# Patient Record
Sex: FEMALE | Race: WHITE | NOT HISPANIC OR LATINO
[De-identification: names, ages, dates, MRNs, and addresses within clinical notes are randomized per-mention and may not be internally consistent; named-entity substitution may affect disease eponyms.]

---

## 2019-02-04 ENCOUNTER — HOSPITAL (OUTPATIENT)
Dept: OTHER | Age: 29
End: 2019-02-04
Attending: FAMILY MEDICINE

## 2019-02-04 ENCOUNTER — HOSPITAL (OUTPATIENT)
Dept: OTHER | Age: 29
End: 2019-02-04

## 2019-02-04 LAB
ALBUMIN SERPL-MCNC: 4.8 GM/DL (ref 3.6–5.1)
ALBUMIN/GLOB SERPL: 1.1 {RATIO} (ref 1–2.4)
ALP SERPL-CCNC: 99 UNIT/L (ref 45–117)
ALT SERPL-CCNC: 18 UNIT/L
AMORPH SED URNS QL MICRO: ABNORMAL
AMPHETAMINES UR QL SCN>500 NG/ML: NEGATIVE
ANALYZER ANC (IANC): ABNORMAL
ANION GAP SERPL CALC-SCNC: 14 MMOL/L (ref 10–20)
APPEARANCE UR: ABNORMAL
AST SERPL-CCNC: 16 UNIT/L
BACTERIA #/AREA URNS HPF: ABNORMAL /HPF
BARBITURATES UR QL SCN>200 NG/ML: NEGATIVE
BASOPHILS # BLD: 0 THOUSAND/MCL (ref 0–0.3)
BASOPHILS NFR BLD: 0 %
BENZODIAZ UR QL SCN>200 NG/ML: NEGATIVE
BILIRUB SERPL-MCNC: 0.6 MG/DL (ref 0.2–1)
BILIRUB UR QL: NEGATIVE
BUN SERPL-MCNC: 10 MG/DL (ref 6–20)
BUN/CREAT SERPL: 11 (ref 7–25)
BZE UR QL SCN>150 NG/ML: NEGATIVE
CALCIUM SERPL-MCNC: 9.4 MG/DL (ref 8.4–10.2)
CANNABINOIDS UR QL SCN>50 NG/ML: POSITIVE
CAOX CRY URNS QL MICRO: ABNORMAL
CHLORIDE: 107 MMOL/L (ref 98–107)
CO2 SERPL-SCNC: 22 MMOL/L (ref 21–32)
COLOR UR: YELLOW
CREAT SERPL-MCNC: 0.89 MG/DL (ref 0.51–0.95)
DIFFERENTIAL METHOD BLD: ABNORMAL
EOSINOPHIL # BLD: 0.1 THOUSAND/MCL (ref 0.1–0.5)
EOSINOPHIL NFR BLD: 0 %
EPITH CASTS #/AREA URNS LPF: ABNORMAL /[LPF]
ERYTHROCYTE [DISTWIDTH] IN BLOOD: 12.3 % (ref 11–15)
FATTY CASTS #/AREA URNS LPF: ABNORMAL /[LPF]
GLOBULIN SER-MCNC: 4.4 GM/DL (ref 2–4)
GLUCOSE SERPL-MCNC: 127 MG/DL (ref 65–99)
GLUCOSE UR-MCNC: NEGATIVE MG/DL
GRAN CASTS #/AREA URNS LPF: ABNORMAL /[LPF]
HCG POINT OF CARE (5HGRST): NEGATIVE
HCG SERPL QL: NEGATIVE
HEMATOCRIT: 44.9 % (ref 36–46.5)
HGB BLD-MCNC: 14.6 GM/DL (ref 12–15.5)
HGB UR QL: ABNORMAL
HYALINE CASTS #/AREA URNS LPF: ABNORMAL /LPF (ref 0–5)
IMM GRANULOCYTES # BLD AUTO: 0 THOUSAND/MCL (ref 0–0.2)
IMM GRANULOCYTES NFR BLD: 0 %
KETONES UR-MCNC: 80 MG/DL
LEUKOCYTE ESTERASE UR QL STRIP: NEGATIVE
LIPASE SERPL-CCNC: 67 UNIT/L (ref 73–393)
LYMPHOCYTES # BLD: 2.8 THOUSAND/MCL (ref 1–4.8)
LYMPHOCYTES NFR BLD: 20 %
MCH RBC QN AUTO: 30.1 PG (ref 26–34)
MCHC RBC AUTO-ENTMCNC: 32.5 GM/DL (ref 32–36.5)
MCV RBC AUTO: 92.6 FL (ref 78–100)
MIXED CELL CASTS #/AREA URNS LPF: ABNORMAL /[LPF]
MONOCYTES # BLD: 0.9 THOUSAND/MCL (ref 0.3–0.9)
MONOCYTES NFR BLD: 6 %
MUCOUS THREADS URNS QL MICRO: PRESENT
NEUTROPHILS # BLD: 10.4 THOUSAND/MCL (ref 1.8–7.7)
NEUTROPHILS NFR BLD: 74 %
NEUTS SEG NFR BLD: ABNORMAL %
NITRITE UR QL: NEGATIVE
NRBC (NRBCRE): 0 /100 WBC
OPIATES UR QL SCN>300 NG/ML: POSITIVE
PCP UR QL SCN>25 NG/ML: NEGATIVE
PH UR: 5 UNIT (ref 5–7)
PLATELET # BLD: 344 THOUSAND/MCL (ref 140–450)
POTASSIUM SERPL-SCNC: 3.3 MMOL/L (ref 3.4–5.1)
PROT SERPL-MCNC: 9.2 GM/DL (ref 6.4–8.2)
PROT UR QL: 30 MG/DL
RBC # BLD: 4.85 MILLION/MCL (ref 4–5.2)
RBC #/AREA URNS HPF: ABNORMAL /HPF (ref 0–3)
RBC CASTS #/AREA URNS LPF: ABNORMAL /[LPF]
RENAL EPI CELLS #/AREA URNS HPF: ABNORMAL /[HPF]
SODIUM SERPL-SCNC: 140 MMOL/L (ref 135–145)
SP GR UR: 1.02 (ref 1–1.03)
SPECIMEN SOURCE: ABNORMAL
SPERM URNS QL MICRO: ABNORMAL
SQUAMOUS #/AREA URNS HPF: ABNORMAL /HPF (ref 0–5)
T VAGINALIS URNS QL MICRO: ABNORMAL
TRANS CELLS #/AREA URNS HPF: ABNORMAL /HPF
TRI-PHOS CRY URNS QL MICRO: ABNORMAL
URATE CRY URNS QL MICRO: ABNORMAL
URINE REFLEX: ABNORMAL
URNS CMNT MICRO: ABNORMAL
UROBILINOGEN UR QL: 0.2 MG/DL (ref 0–1)
WAXY CASTS #/AREA URNS LPF: ABNORMAL /[LPF]
WBC # BLD: 14.2 THOUSAND/MCL (ref 4.2–11)
WBC #/AREA URNS HPF: ABNORMAL /HPF (ref 0–5)
WBC CASTS #/AREA URNS LPF: ABNORMAL /[LPF]
YEAST HYPHAE URNS QL MICRO: ABNORMAL
YEAST URNS QL MICRO: ABNORMAL

## 2019-02-05 LAB
ANALYZER ANC (IANC): ABNORMAL
ANALYZER ANC (IANC): ABNORMAL
ANION GAP SERPL CALC-SCNC: 10 MMOL/L (ref 10–20)
BASOPHILS # BLD: 0 THOUSAND/MCL (ref 0–0.3)
BASOPHILS NFR BLD: 0 %
BUN SERPL-MCNC: 4 MG/DL (ref 6–20)
BUN/CREAT SERPL: 6 (ref 7–25)
CALCIUM SERPL-MCNC: 8.7 MG/DL (ref 8.4–10.2)
CHLORIDE: 110 MMOL/L (ref 98–107)
CO2 SERPL-SCNC: 22 MMOL/L (ref 21–32)
CREAT SERPL-MCNC: 0.68 MG/DL (ref 0.51–0.95)
DIFFERENTIAL METHOD BLD: ABNORMAL
EOSINOPHIL # BLD: 0 THOUSAND/MCL (ref 0.1–0.5)
EOSINOPHIL NFR BLD: 0 %
ERYTHROCYTE [DISTWIDTH] IN BLOOD: 12.3 % (ref 11–15)
ERYTHROCYTE [DISTWIDTH] IN BLOOD: 12.4 % (ref 11–15)
FOLATE SERPL-MCNC: 10.3 NG/ML
GLUCOSE SERPL-MCNC: 131 MG/DL (ref 65–99)
HEMATOCRIT: 34.4 % (ref 36–46.5)
HEMATOCRIT: 34.7 % (ref 36–46.5)
HGB BLD-MCNC: 11.5 GM/DL (ref 12–15.5)
HGB BLD-MCNC: 11.5 GM/DL (ref 12–15.5)
IMM GRANULOCYTES # BLD AUTO: 0 THOUSAND/MCL (ref 0–0.2)
IMM GRANULOCYTES NFR BLD: 0 %
LYMPHOCYTES # BLD: 2.2 THOUSAND/MCL (ref 1–4.8)
LYMPHOCYTES NFR BLD: 17 %
MCH RBC QN AUTO: 30.3 PG (ref 26–34)
MCH RBC QN AUTO: 30.3 PG (ref 26–34)
MCHC RBC AUTO-ENTMCNC: 33.1 GM/DL (ref 32–36.5)
MCHC RBC AUTO-ENTMCNC: 33.4 GM/DL (ref 32–36.5)
MCV RBC AUTO: 90.5 FL (ref 78–100)
MCV RBC AUTO: 91.3 FL (ref 78–100)
MONOCYTES # BLD: 0.9 THOUSAND/MCL (ref 0.3–0.9)
MONOCYTES NFR BLD: 7 %
NEUTROPHILS # BLD: 9.8 THOUSAND/MCL (ref 1.8–7.7)
NEUTROPHILS NFR BLD: 76 %
NEUTS SEG NFR BLD: ABNORMAL %
NRBC (NRBCRE): 0 /100 WBC
NRBC (NRBCRE): 0 /100 WBC
PLATELET # BLD: 209 THOUSAND/MCL (ref 140–450)
PLATELET # BLD: 232 THOUSAND/MCL (ref 140–450)
POTASSIUM SERPL-SCNC: 3.6 MMOL/L (ref 3.4–5.1)
RBC # BLD: 3.8 MILLION/MCL (ref 4–5.2)
RBC # BLD: 3.8 MILLION/MCL (ref 4–5.2)
SODIUM SERPL-SCNC: 138 MMOL/L (ref 135–145)
TSH SERPL-ACNC: 1.93 MCUNIT/ML (ref 0.35–5)
VIT B12 SERPL-MCNC: 352 PG/ML (ref 211–911)
WBC # BLD: 13 THOUSAND/MCL (ref 4.2–11)
WBC # BLD: 17.7 THOUSAND/MCL (ref 4.2–11)

## 2019-02-06 LAB
ANALYZER ANC (IANC): ABNORMAL
ANION GAP SERPL CALC-SCNC: 9 MMOL/L (ref 10–20)
BUN SERPL-MCNC: 3 MG/DL (ref 6–20)
BUN/CREAT SERPL: 4 (ref 7–25)
CALCIUM SERPL-MCNC: 7.9 MG/DL (ref 8.4–10.2)
CHLORIDE: 108 MMOL/L (ref 98–107)
CO2 SERPL-SCNC: 23 MMOL/L (ref 21–32)
CREAT SERPL-MCNC: 0.76 MG/DL (ref 0.51–0.95)
ERYTHROCYTE [DISTWIDTH] IN BLOOD: 12.2 % (ref 11–15)
GLUCOSE SERPL-MCNC: 94 MG/DL (ref 65–99)
HEMATOCRIT: 34.8 % (ref 36–46.5)
HGB BLD-MCNC: 11.5 GM/DL (ref 12–15.5)
MCH RBC QN AUTO: 30.1 PG (ref 26–34)
MCHC RBC AUTO-ENTMCNC: 33 GM/DL (ref 32–36.5)
MCV RBC AUTO: 91.1 FL (ref 78–100)
NRBC (NRBCRE): 0 /100 WBC
PLATELET # BLD: 209 THOUSAND/MCL (ref 140–450)
POTASSIUM SERPL-SCNC: 3.4 MMOL/L (ref 3.4–5.1)
RBC # BLD: 3.82 MILLION/MCL (ref 4–5.2)
SODIUM SERPL-SCNC: 137 MMOL/L (ref 135–145)
WBC # BLD: 9.1 THOUSAND/MCL (ref 4.2–11)

## 2019-09-16 ENCOUNTER — OFFICE VISIT (OUTPATIENT)
Dept: FAMILY MEDICINE CLINIC | Age: 29
End: 2019-09-16

## 2019-09-16 VITALS
HEIGHT: 64 IN | DIASTOLIC BLOOD PRESSURE: 74 MMHG | RESPIRATION RATE: 17 BRPM | HEART RATE: 107 BPM | TEMPERATURE: 98.2 F | OXYGEN SATURATION: 99 % | BODY MASS INDEX: 26.63 KG/M2 | WEIGHT: 156 LBS | SYSTOLIC BLOOD PRESSURE: 113 MMHG

## 2019-09-16 DIAGNOSIS — G43.109 MIGRAINE WITH AURA AND WITHOUT STATUS MIGRAINOSUS, NOT INTRACTABLE: ICD-10-CM

## 2019-09-16 DIAGNOSIS — G40.419 OTHER GENERALIZED EPILEPSY, INTRACTABLE, WITHOUT STATUS EPILEPTICUS (HCC): ICD-10-CM

## 2019-09-16 DIAGNOSIS — K44.9 HIATAL HERNIA: ICD-10-CM

## 2019-09-16 DIAGNOSIS — E03.9 ACQUIRED HYPOTHYROIDISM: ICD-10-CM

## 2019-09-16 DIAGNOSIS — Z00.00 WELL WOMAN EXAM (NO GYNECOLOGICAL EXAM): Primary | ICD-10-CM

## 2019-09-16 DIAGNOSIS — R11.15 INTRACTABLE CYCLICAL VOMITING WITH NAUSEA: ICD-10-CM

## 2019-09-16 DIAGNOSIS — G43.D1 INTRACTABLE ABDOMINAL MIGRAINE: ICD-10-CM

## 2019-09-16 PROBLEM — K21.9 GERD (GASTROESOPHAGEAL REFLUX DISEASE): Status: ACTIVE | Noted: 2019-09-16

## 2019-09-16 PROBLEM — G40.909 EPILEPSY (HCC): Status: ACTIVE | Noted: 2019-09-16

## 2019-09-16 RX ORDER — LEVOTHYROXINE SODIUM 88 UG/1
TABLET ORAL
COMMUNITY
End: 2019-09-17 | Stop reason: SDUPTHER

## 2019-09-16 RX ORDER — RIZATRIPTAN BENZOATE 10 MG/1
10 TABLET, ORALLY DISINTEGRATING ORAL
COMMUNITY

## 2019-09-16 RX ORDER — RIBOFLAVIN (VITAMIN B2) 400 MG
TABLET ORAL
COMMUNITY

## 2019-09-16 RX ORDER — FLUTICASONE PROPIONATE 50 UG/1
2 SPRAY, METERED NASAL
COMMUNITY

## 2019-09-16 RX ORDER — ONDANSETRON 8 MG/1
8 TABLET, ORALLY DISINTEGRATING ORAL
COMMUNITY

## 2019-09-16 RX ORDER — LEVETIRACETAM 750 MG/1
750 TABLET ORAL 4 TIMES DAILY
COMMUNITY
End: 2019-09-17 | Stop reason: SDUPTHER

## 2019-09-16 RX ORDER — AMITRIPTYLINE HYDROCHLORIDE 100 MG/1
TABLET, FILM COATED ORAL
COMMUNITY
End: 2019-09-17 | Stop reason: SDUPTHER

## 2019-09-16 NOTE — PROGRESS NOTES
[address] [place] [address]               [phone]      Cindy Méndez is a 34 y.o. female and presents with Establish Care; Seizure; and Joint Swelling       Assessment/Plan:    Diagnoses and all orders for this visit:    1. Well woman exam (no gynecological exam)  Comments:  [V70.0]  Here today to establish care and manage acute and chronic conditions  Recently had her gynecological exam done at another facility    2. Hiatal hernia  - States she has a history of hiatal hernia, this was brought on about due to her intractable cyclical vomiting problems    3. Intractable cyclical vomiting with nausea  -     REFERRAL TO NEUROLOGY  - States she was diagnosed with this at the same time she was diagnosed with having her  -We will refer to neurology for further management  4. Other generalized epilepsy, intractable, without status epilepticus ([facility] [address]  -     REFERRAL TO NEUROLOGY  - Has a history of tonic-clonic seizures first starting at the age of 14  - Her last seizure was May 2019  - She is on Keppra for her seizures, and amitriptyline for her migraines and uses Maxalt as an abortive medication    5. Acquired hypothyroidism  - She is currently on Synthroid 88 mics a day  - I suggested drawing her levels however she states she just had them done when she was in [place] trying to get those records to us    6. Intractable abdominal migraine  -     REFERRAL TO NEUROLOGY    7. Migraine with aura and without status migrainosus, not intractable  -     REFERRAL TO NEUROLOGY  Ms. Filipe Lopez is a very pleasant 27-year-old female with a past medical history most significant for seizures and abdominal migraines. Her seizures are not controlled however she is being referred to neurology for further management    Greater than 50% of the additional 45 minutes was spent in counseling and coordination of care.        Follow-up and Dispositions    · Return in about 6 months (around 3/16/2020), or if symptoms worsen or fail to improve, for hypothyroid, 15 minutes. Subjective:    Here today to establish care    ROS:     Review of Systems   Constitutional: Negative. HENT:        Esophogeal tears from chronic vomiting   Eyes: Negative. Respiratory: Negative. Cardiovascular: Negative. Gastrointestinal: Positive for abdominal pain, blood in stool, constipation, heartburn, nausea and vomiting. Heartburn due to hiatal hernia  N/V with cyclic vomiting syndrome  abd pain with abd migraines  occasioal brb in  [place]  Constipation medication induced   Genitourinary: Negative. Musculoskeletal:        Sometimes has generalzed joint pain mostly r/t medical conditions   Skin:        Rosacea    Neurological: Positive for dizziness, seizures, loss of consciousness and headaches. Psychiatric/Behavioral: Positive for memory loss. Negative for depression, hallucinations, substance abuse and suicidal ideas. The patient is nervous/anxious. The patient does not have insomnia. Anxiety r/t seizure disorder  Memory loss determined to be r/t long seizures       The problem list was updated as a part of today's visit. Patient Active Problem List   Diagnosis Code    [facility] ([facility] X90.770    Cyclic vomiting syndrome G43. A0    GERD (gastroesophageal reflux disease) K21.9    Hiatal hernia K44.9    Acquired hypothyroidism E03.9       The PSH, FH were reviewed. SH:  Social History     Tobacco Use    Smoking status: Never Smoker    Smokeless tobacco: Never Used   Substance Use Topics    Alcohol use: Not Currently    Drug use: Not on file       Medications/Allergies:  Current Outpatient Medications on File Prior to Visit   Medication Sig Dispense Refill    ubidecarenone (COQ-10 PO) Take  by mouth.  riboflavin, vitamin B2, 400 mg tab Take  by mouth.  cholecalciferol, vitamin D3, (VITAMIN D3 PO) Take  by mouth.  OTHER 400 mg.  Indications: julio c-mag      levETIRAcetam (KEPPRA) 750 mg tablet Take 750 mg by mouth four (4) times daily.  levothyroxine (SYNTHROID) 88 mcg tablet Take  by mouth Daily (before breakfast).  amitriptyline (ELAVIL) 100 mg tablet Take  by mouth nightly.  rizatriptan (MAXALT-MLT) 10 mg disintegrating tablet Take 10 mg by mouth once as needed for Migraine.  fluticasone propionate (FLONASE ALLERGY RELIEF) 50 mcg/actuation nasal spray 2 Sprays by Both Nostrils route daily as needed for Rhinitis.  ondansetron (ZOFRAN ODT) 8 mg disintegrating tablet Take 8 mg by mouth every eight (8) hours as needed for Nausea.  levonorgestrel (MIRENA) 20 mcg/24 hours (5 yrs) 52 mg IUD 1 Device by IntraUTERine route once. No current facility-administered medications on file prior to visit. Allergies   Allergen Reactions    Milk Containing Products Anaphylaxis    Sulfa (Sulfonamide Antibiotics) Anaphylaxis    Ciprofloxacin Nausea and Vomiting    Codeine Nausea and Vomiting    Doxycycline Hives    Gluten Swelling    Penicillins Hives       Objective:  Visit Vitals  /74 (BP 1 Location: Right arm, BP Patient Position: Sitting)   Pulse (!) 107   Temp 98.2 °F (36.8 °C) (Oral)   Resp 17   Ht 5' 4\" (1.626 m)   Wt 156 lb (70.8 kg)   SpO2 99%   BMI 26.78 kg/m²    Body mass index is 26.78 kg/m². Physical assessment  Physical Exam   Constitutional: She is oriented to person, place, and time and well-developed, well-nourished, and in no distress. Vital signs are normal.   HENT:   Head: Normocephalic and atraumatic. Right Ear: Hearing, tympanic membrane, external ear and ear canal normal.   Left Ear: Hearing, tympanic membrane, external ear and ear canal normal.   Nose: Nose normal.   Mouth/Throat: Uvula is midline, oropharynx is clear and moist and mucous membranes are normal.   Eyes: Pupils are equal, round, and reactive to light. Conjunctivae, EOM and lids are normal.   Neck: Trachea normal and normal range of motion. Neck supple.  No JVD present. Carotid bruit is not present. No tracheal deviation present. No thyroid mass and no thyromegaly present. Cardiovascular: Normal rate, regular rhythm, S1 normal, S2 normal, normal heart sounds and intact distal pulses. Pulmonary/Chest: Effort normal and breath sounds normal.   Abdominal: Soft. Normal appearance and bowel sounds are normal. There is tenderness in the left lower quadrant. There is no CVA tenderness. Musculoskeletal: Normal range of motion. Lymphadenopathy:        Head (right side): No submental, no submandibular, no tonsillar, no preauricular, no posterior auricular and no occipital adenopathy present. Head (left side): No submental, no submandibular, no tonsillar, no preauricular, no posterior auricular and no occipital adenopathy present. She has no cervical adenopathy. Neurological: She is alert and oriented to person, place, and time. She has normal motor skills. Gait normal.   Skin: Skin is warm and dry. Psychiatric: Mood, memory, affect and judgment normal.   Nursing note and vitals reviewed. Labwork and Ancillary Studies:    CBC w/Diff  No results found for: WBC, WBCLT, HGB, HGBP, PLT, HGBEXT, PLTEXT, HGBEXT, PLTEXT      Basic Metabolic Profile  No results found for: NA, K, CL, CO2, AGAP, GLU, BUN, CREA, BUCR, GFRAA, GFRNA, CA, GFRAA     Cholesterol  No results found for: CHOL, CHOLX, CHLST, CHOLV, HDL, HDLP, LDL, LDLC, DLDLP, TGLX, TRIGL, TRIGP, CHHD, [facility]    Health Maintenance:   Health Maintenance   Topic Date Due    DTaP/Tdap/Td series (1 - Tdap) 05/16/2013    PAP AKA CERVICAL CYTOLOGY  05/16/2013    Influenza Age 5 to Adult  08/01/2019    Pneumococcal 0-64 years  Aged Out       I have discussed the diagnosis with the patient and the intended plan as seen in the above orders. The patient has received an After-Visit Summary and questions were answered concerning future plans.      An After Visit Summary was printed and given to the patient. All diagnosis have been discussed with the patient and all of the patient's questions have been answered. Follow-up and Dispositions    · Return in about 6 months (around 3/16/2020), or if symptoms worsen or fail to improve, for hypothyroid, 15 minutes. Gillian Billings, [facility]  [address] [facility]   [address] [facility] [address]  [address]

## 2019-09-16 NOTE — PATIENT INSTRUCTIONS
Hypothyroidism: Care Instructions  Your Care Instructions    You have hypothyroidism, which means that your body is not making enough thyroid hormone. This hormone helps your body use energy. If your thyroid level is low, you may feel tired, be constipated, have an increase in your blood pressure, or have dry skin or memory problems. You may also get cold easily, even when it is warm. Women with low thyroid levels may have heavy menstrual periods. A blood test to find your thyroid-stimulating hormone (TSH) level is used to check for hypothyroidism. A high TSH level may mean that you have low thyroid. When your body is not making enough thyroid hormone, TSH levels rise in an effort to make the body produce more. The treatment for hypothyroidism is to take thyroid hormone pills. You should start to feel better in 1 to 2 weeks. But it can take several months to see changes in the TSH level. You will need regular visits with your doctor to make sure you have the right dose of medicine. Most people need treatment for the rest of their lives. You will need to see your doctor regularly to have blood tests and to make sure you are doing well. Follow-up care is a key part of your treatment and safety. Be sure to make and go to all appointments, and call your doctor if you are having problems. It's also a good idea to know your test results and keep a list of the medicines you take. How can you care for yourself at home? · Take your thyroid hormone medicine exactly as prescribed. Call your doctor if you think you are having a problem with your medicine. Most people do not have side effects if they take the right amount of medicine regularly. ? Take the medicine 30 minutes before breakfast, and do not take it with calcium, vitamins, or iron. ? Do not take extra doses of your thyroid medicine. It will not help you get better any faster, and it may cause side effects.   ? If you forget to take a dose, do NOT take a double dose of medicine. Take your usual dose the next day. · Tell your doctor about all prescription, herbal, or over-the-counter products you take. · Take care of yourself. Eat a healthy diet, get enough sleep, and get regular exercise. When should you call for help? Call 911 anytime you think you may need emergency care. For example, call if:    · You passed out (lost consciousness).     · You have severe trouble breathing.     · You have a very slow heartbeat (less than 60 beats a minute).     · You have a low body temperature (95°F or below).    Call your doctor now or seek immediate medical care if:    · You feel tired, sluggish, or weak.     · You have trouble remembering things or concentrating.     · You do not begin to feel better 2 weeks after starting your medicine.    Watch closely for changes in your health, and be sure to contact your doctor if you have any problems. Where can you learn more? Go to http://blake-safia.info/. Enter J741 in the search box to learn more about \"Hypothyroidism: Care Instructions. \"  Current as of: November 6, 2018  Content Version: 12.1  © 6344-8427 [employer]. Care instructions adapted under license by [employer] (which disclaims liability or warranty for this information). If you have questions about a medical condition or this instruction, always ask your healthcare professional. [address] any warranty or liability for your use of this information.

## 2019-09-17 RX ORDER — LEVETIRACETAM 750 MG/1
750 TABLET ORAL 4 TIMES DAILY
Qty: 360 TAB | Refills: 1 | Status: SHIPPED | OUTPATIENT
Start: 2019-09-17 | End: 2020-07-29

## 2019-09-17 RX ORDER — LEVOTHYROXINE SODIUM 88 UG/1
88 TABLET ORAL
Qty: 90 TAB | Refills: 1 | Status: SHIPPED | OUTPATIENT
Start: 2019-09-17 | End: 2020-07-29

## 2019-09-17 RX ORDER — AMITRIPTYLINE HYDROCHLORIDE 100 MG/1
100 TABLET, FILM COATED ORAL
Qty: 90 TAB | Refills: 1 | Status: SHIPPED | OUTPATIENT
Start: 2019-09-17 | End: 2020-06-05

## 2019-10-08 RX ORDER — LEVETIRACETAM 750 MG/1
750 TABLET, EXTENDED RELEASE ORAL 4 TIMES DAILY
Qty: 128 TAB | Refills: 0 | Status: SHIPPED | OUTPATIENT
Start: 2019-10-08 | End: 2020-07-29

## 2020-07-06 RX ORDER — AMITRIPTYLINE HYDROCHLORIDE 100 MG/1
TABLET, FILM COATED ORAL
Qty: 30 TAB | Refills: 11 | OUTPATIENT
Start: 2020-07-06

## 2020-07-20 ENCOUNTER — APPOINTMENT (OUTPATIENT)
Dept: CT IMAGING | Age: 30
DRG: 103 | End: 2020-07-20
Attending: PHYSICIAN ASSISTANT
Payer: OTHER GOVERNMENT

## 2020-07-20 ENCOUNTER — HOSPITAL ENCOUNTER (EMERGENCY)
Age: 30
Discharge: HOME | DRG: 103 | End: 2020-07-20
Attending: EMERGENCY MEDICINE
Payer: OTHER GOVERNMENT

## 2020-07-20 VITALS
DIASTOLIC BLOOD PRESSURE: 70 MMHG | WEIGHT: 170 LBS | OXYGEN SATURATION: 100 % | SYSTOLIC BLOOD PRESSURE: 111 MMHG | HEART RATE: 87 BPM | BODY MASS INDEX: 29.18 KG/M2 | RESPIRATION RATE: 16 BRPM | TEMPERATURE: 98 F

## 2020-07-20 DIAGNOSIS — R11.15 CYCLICAL VOMITING: Primary | ICD-10-CM

## 2020-07-20 DIAGNOSIS — K52.9 COLITIS: ICD-10-CM

## 2020-07-20 LAB
ALBUMIN SERPL-MCNC: 4.2 G/DL (ref 3.4–5)
ALBUMIN/GLOB SERPL: 0.9 {RATIO} (ref 0.8–1.7)
ALP SERPL-CCNC: 112 U/L (ref 45–117)
ALT SERPL-CCNC: 43 U/L (ref 13–56)
ANION GAP SERPL CALC-SCNC: 6 MMOL/L (ref 3–18)
AST SERPL-CCNC: 23 U/L (ref 10–38)
BASOPHILS # BLD: 0 K/UL (ref 0–0.1)
BASOPHILS NFR BLD: 0 % (ref 0–2)
BILIRUB SERPL-MCNC: 0.6 MG/DL (ref 0.2–1)
BUN SERPL-MCNC: 13 MG/DL (ref 7–18)
BUN/CREAT SERPL: 14 (ref 12–20)
CALCIUM SERPL-MCNC: 9.1 MG/DL (ref 8.5–10.1)
CHLORIDE SERPL-SCNC: 108 MMOL/L (ref 100–111)
CO2 SERPL-SCNC: 25 MMOL/L (ref 21–32)
CREAT SERPL-MCNC: 0.96 MG/DL (ref 0.6–1.3)
DIFFERENTIAL METHOD BLD: ABNORMAL
EOSINOPHIL # BLD: 0 K/UL (ref 0–0.4)
EOSINOPHIL NFR BLD: 0 % (ref 0–5)
ERYTHROCYTE [DISTWIDTH] IN BLOOD BY AUTOMATED COUNT: 13.4 % (ref 11.6–14.5)
GLOBULIN SER CALC-MCNC: 4.8 G/DL (ref 2–4)
GLUCOSE SERPL-MCNC: 121 MG/DL (ref 74–99)
HCG SERPL QL: NEGATIVE
HCT VFR BLD AUTO: 40.6 % (ref 35–45)
HGB BLD-MCNC: 13.1 G/DL (ref 12–16)
LIPASE SERPL-CCNC: 66 U/L (ref 73–393)
LYMPHOCYTES # BLD: 2.4 K/UL (ref 0.9–3.6)
LYMPHOCYTES NFR BLD: 12 % (ref 21–52)
MCH RBC QN AUTO: 29.4 PG (ref 24–34)
MCHC RBC AUTO-ENTMCNC: 32.3 G/DL (ref 31–37)
MCV RBC AUTO: 91.2 FL (ref 74–97)
MONOCYTES # BLD: 1 K/UL (ref 0.05–1.2)
MONOCYTES NFR BLD: 5 % (ref 3–10)
NEUTS SEG # BLD: 16.2 K/UL (ref 1.8–8)
NEUTS SEG NFR BLD: 83 % (ref 40–73)
PLATELET # BLD AUTO: 473 K/UL (ref 135–420)
PMV BLD AUTO: 11.1 FL (ref 9.2–11.8)
POTASSIUM SERPL-SCNC: 3.5 MMOL/L (ref 3.5–5.5)
PROT SERPL-MCNC: 9 G/DL (ref 6.4–8.2)
RBC # BLD AUTO: 4.45 M/UL (ref 4.2–5.3)
SODIUM SERPL-SCNC: 139 MMOL/L (ref 136–145)
WBC # BLD AUTO: 19.7 K/UL (ref 4.6–13.2)

## 2020-07-20 PROCEDURE — 96361 HYDRATE IV INFUSION ADD-ON: CPT

## 2020-07-20 PROCEDURE — 93005 ELECTROCARDIOGRAM TRACING: CPT

## 2020-07-20 PROCEDURE — 74011250636 HC RX REV CODE- 250/636: Performed by: EMERGENCY MEDICINE

## 2020-07-20 PROCEDURE — 84703 CHORIONIC GONADOTROPIN ASSAY: CPT

## 2020-07-20 PROCEDURE — 74011250636 HC RX REV CODE- 250/636: Performed by: PHYSICIAN ASSISTANT

## 2020-07-20 PROCEDURE — 80053 COMPREHEN METABOLIC PANEL: CPT

## 2020-07-20 PROCEDURE — 74011636320 HC RX REV CODE- 636/320: Performed by: EMERGENCY MEDICINE

## 2020-07-20 PROCEDURE — 96374 THER/PROPH/DIAG INJ IV PUSH: CPT

## 2020-07-20 PROCEDURE — 85025 COMPLETE CBC W/AUTO DIFF WBC: CPT

## 2020-07-20 PROCEDURE — 96376 TX/PRO/DX INJ SAME DRUG ADON: CPT

## 2020-07-20 PROCEDURE — 74177 CT ABD & PELVIS W/CONTRAST: CPT

## 2020-07-20 PROCEDURE — 83690 ASSAY OF LIPASE: CPT

## 2020-07-20 PROCEDURE — 99285 EMERGENCY DEPT VISIT HI MDM: CPT

## 2020-07-20 PROCEDURE — 96375 TX/PRO/DX INJ NEW DRUG ADDON: CPT

## 2020-07-20 RX ORDER — HALOPERIDOL 5 MG/ML
2 INJECTION INTRAMUSCULAR
Status: COMPLETED | OUTPATIENT
Start: 2020-07-20 | End: 2020-07-20

## 2020-07-20 RX ORDER — ONDANSETRON 2 MG/ML
4 INJECTION INTRAMUSCULAR; INTRAVENOUS
Status: COMPLETED | OUTPATIENT
Start: 2020-07-20 | End: 2020-07-20

## 2020-07-20 RX ORDER — ONDANSETRON 2 MG/ML
INJECTION INTRAMUSCULAR; INTRAVENOUS
Status: DISCONTINUED
Start: 2020-07-20 | End: 2020-07-21 | Stop reason: HOSPADM

## 2020-07-20 RX ORDER — CIPROFLOXACIN 500 MG/1
500 TABLET ORAL 2 TIMES DAILY
Qty: 14 TAB | Refills: 0 | Status: SHIPPED | OUTPATIENT
Start: 2020-07-20 | End: 2020-07-25

## 2020-07-20 RX ORDER — DIPHENHYDRAMINE HYDROCHLORIDE 50 MG/ML
25 INJECTION, SOLUTION INTRAMUSCULAR; INTRAVENOUS
Status: COMPLETED | OUTPATIENT
Start: 2020-07-20 | End: 2020-07-20

## 2020-07-20 RX ORDER — KETOROLAC TROMETHAMINE 30 MG/ML
30 INJECTION, SOLUTION INTRAMUSCULAR; INTRAVENOUS
Status: COMPLETED | OUTPATIENT
Start: 2020-07-20 | End: 2020-07-20

## 2020-07-20 RX ORDER — LORAZEPAM 2 MG/ML
0.5 INJECTION INTRAMUSCULAR
Status: COMPLETED | OUTPATIENT
Start: 2020-07-20 | End: 2020-07-20

## 2020-07-20 RX ORDER — NALOXONE HYDROCHLORIDE 0.4 MG/ML
0.4 INJECTION, SOLUTION INTRAMUSCULAR; INTRAVENOUS; SUBCUTANEOUS AS NEEDED
Status: DISCONTINUED | OUTPATIENT
Start: 2020-07-20 | End: 2020-07-21 | Stop reason: HOSPADM

## 2020-07-20 RX ORDER — HALOPERIDOL 5 MG/ML
3 INJECTION INTRAMUSCULAR
Status: COMPLETED | OUTPATIENT
Start: 2020-07-20 | End: 2020-07-20

## 2020-07-20 RX ORDER — POLYETHYLENE GLYCOL 3350 17 G/17G
17 POWDER, FOR SOLUTION ORAL DAILY
Qty: 116 G | Refills: 0 | Status: SHIPPED | OUTPATIENT
Start: 2020-07-20

## 2020-07-20 RX ORDER — SENNOSIDES 8.6 MG/1
1 TABLET ORAL DAILY
Qty: 20 TAB | Refills: 0 | Status: SHIPPED | OUTPATIENT
Start: 2020-07-20

## 2020-07-20 RX ORDER — MORPHINE SULFATE 4 MG/ML
4 INJECTION, SOLUTION INTRAMUSCULAR; INTRAVENOUS
Status: COMPLETED | OUTPATIENT
Start: 2020-07-20 | End: 2020-07-20

## 2020-07-20 RX ORDER — METRONIDAZOLE 500 MG/1
500 TABLET ORAL 3 TIMES DAILY
Qty: 21 TAB | Refills: 0 | Status: SHIPPED | OUTPATIENT
Start: 2020-07-20 | End: 2020-07-25

## 2020-07-20 RX ADMIN — DIPHENHYDRAMINE HYDROCHLORIDE 25 MG: 50 INJECTION, SOLUTION INTRAMUSCULAR; INTRAVENOUS at 19:57

## 2020-07-20 RX ADMIN — SODIUM CHLORIDE 1000 ML: 900 INJECTION, SOLUTION INTRAVENOUS at 17:41

## 2020-07-20 RX ADMIN — KETOROLAC TROMETHAMINE 30 MG: 30 INJECTION, SOLUTION INTRAMUSCULAR at 19:55

## 2020-07-20 RX ADMIN — ONDANSETRON 4 MG: 2 INJECTION INTRAMUSCULAR; INTRAVENOUS at 18:36

## 2020-07-20 RX ADMIN — IOPAMIDOL 93 ML: 612 INJECTION, SOLUTION INTRAVENOUS at 20:07

## 2020-07-20 RX ADMIN — HALOPERIDOL LACTATE 2 MG: 5 INJECTION, SOLUTION INTRAMUSCULAR at 17:03

## 2020-07-20 RX ADMIN — MORPHINE SULFATE 4 MG: 4 INJECTION, SOLUTION INTRAMUSCULAR; INTRAVENOUS at 17:24

## 2020-07-20 RX ADMIN — SODIUM CHLORIDE 1000 ML: 900 INJECTION, SOLUTION INTRAVENOUS at 21:01

## 2020-07-20 RX ADMIN — LORAZEPAM 0.5 MG: 2 INJECTION, SOLUTION INTRAMUSCULAR; INTRAVENOUS at 19:54

## 2020-07-20 RX ADMIN — ONDANSETRON 4 MG: 2 INJECTION INTRAMUSCULAR; INTRAVENOUS at 17:03

## 2020-07-20 RX ADMIN — HALOPERIDOL LACTATE 3 MG: 5 INJECTION, SOLUTION INTRAMUSCULAR at 19:56

## 2020-07-20 NOTE — ED NOTES
Pt states inability to void for urine specimen, instructed to keep arm straight to aid in IV fluid admin. Pt states understanding.

## 2020-07-20 NOTE — ED PROVIDER NOTES
EMERGENCY DEPARTMENT HISTORY AND PHYSICAL EXAM      Date: 7/20/2020  Patient Name: Jaja Trevizo    History of Presenting Illness     Chief Complaint   Patient presents with    Vomiting       History Provided By: Patient    HPI: Jaja Trevizo, 31 y.o. female PMHx significant for epilepsy, cyclic vomiting, GERD, hiatal hernia presents via ambulance to the ED with cc of cyclical vomiting. Patient reports she believes it is because she is constipated. Patient reports she smokes medical marijuana to treat her cyclical vomiting. Patient is unsure of last BM. Patient reports taking Elavil for cyclical vomiting symptoms. Pt is currently on menstrual cycle. Denies previous abd surgeries. There are no other complaints, changes, or physical findings at this time. PCP: Yris Tran NP    No current facility-administered medications on file prior to encounter. Current Outpatient Medications on File Prior to Encounter   Medication Sig Dispense Refill    amitriptyline (ELAVIL) 100 mg tablet TAKE 1 TABLET NIGHTLY 30 Tab 0    levETIRAcetam (KEPPRA XR) 750 mg ER tablet Take 1 Tab by mouth four (4) times daily. 128 Tab 0    levETIRAcetam (KEPPRA) 750 mg tablet Take 1 Tab by mouth four (4) times daily. 360 Tab 1    levothyroxine (SYNTHROID) 88 mcg tablet Take 1 Tab by mouth Daily (before breakfast). 90 Tab 1    ubidecarenone (COQ-10 PO) Take  by mouth.  riboflavin, vitamin B2, 400 mg tab Take  by mouth.  cholecalciferol, vitamin D3, (VITAMIN D3 PO) Take  by mouth.  OTHER 400 mg. Indications: julio c-mag      rizatriptan (MAXALT-MLT) 10 mg disintegrating tablet Take 10 mg by mouth once as needed for Migraine.  fluticasone propionate (FLONASE ALLERGY RELIEF) 50 mcg/actuation nasal spray 2 Sprays by Both Nostrils route daily as needed for Rhinitis.  ondansetron (ZOFRAN ODT) 8 mg disintegrating tablet Take 8 mg by mouth every eight (8) hours as needed for Nausea.       levonorgestrel (MIRENA) 20 mcg/24 hours (5 yrs) 52 mg IUD 1 Device by IntraUTERine route once. Past History     Past Medical History:  Past Medical History:   Diagnosis Date    Cyclic vomiting syndrome     Epilepsy ([address]     GERD (gastroesophageal reflux disease)     Hiatal hernia        Past Surgical History:  History reviewed. No pertinent surgical history. Family History:  Family History   Problem Relation Age of Onset    Osteoporosis Mother     Osteoporosis Maternal Grandmother        Social History:  Social History     Tobacco Use    Smoking status: Never Smoker    Smokeless tobacco: Never Used   Substance Use Topics    Alcohol use: Not Currently    Drug use: Not on file       Allergies: Allergies   Allergen Reactions    Milk Containing Products Anaphylaxis    Sulfa (Sulfonamide Antibiotics) Anaphylaxis    Ciprofloxacin Nausea and Vomiting    Codeine Nausea and Vomiting    Doxycycline Hives    Gluten Swelling    Penicillins Hives         Review of Systems   Review of Systems   Constitutional: Negative for chills and fever. Respiratory: Negative for shortness of breath. Cardiovascular: Negative for chest pain. Gastrointestinal: Positive for vomiting. Negative for abdominal pain and nausea. Genitourinary: Negative for flank pain. Musculoskeletal: Negative for back pain and myalgias. Skin: Negative for color change, pallor, rash and wound. Neurological: Negative for dizziness, weakness and light-headedness. All other systems reviewed and are negative. Physical Exam   Physical Exam  Vitals signs and nursing note reviewed. Constitutional:       General: She is not in acute distress. Appearance: She is well-developed. Comments: Patient actively vomiting   HENT:      Head: Normocephalic and atraumatic. Eyes:      Conjunctiva/sclera: Conjunctivae normal.   Cardiovascular:      Rate and Rhythm: Normal rate and regular rhythm. Heart sounds: Normal heart sounds. Pulmonary:      Effort: Pulmonary effort is normal. No respiratory distress. Breath sounds: Normal breath sounds. Abdominal:      General: Bowel sounds are normal. There is no distension. Palpations: Abdomen is soft. Comments: General abdominal tenderness  No guarding or rigidity  Abdomen soft, nondistended   Musculoskeletal: Normal range of motion. Skin:     General: Skin is warm. Findings: No rash. Neurological:      Mental Status: She is alert and oriented to person, place, and time. Psychiatric:         Behavior: Behavior normal.         Diagnostic Study Results     Labs -     Recent Results (from the past 12 hour(s))   CBC WITH AUTOMATED DIFF    Collection Time: 07/20/20  5:07 PM   Result Value Ref Range    WBC 19.7 (H) 4.6 - 13.2 K/uL    RBC 4.45 4.20 - 5.30 M/uL    HGB 13.1 12.0 - 16.0 g/dL    HCT 40.6 35.0 - 45.0 %    MCV 91.2 74.0 - 97.0 FL    MCH 29.4 24.0 - 34.0 PG    MCHC 32.3 31.0 - 37.0 g/dL    RDW 13.4 11.6 - 14.5 %    PLATELET 951 (H) 789 - 420 K/uL    MPV 11.1 9.2 - 11.8 FL    NEUTROPHILS 83 (H) 40 - 73 %    LYMPHOCYTES 12 (L) 21 - 52 %    MONOCYTES 5 3 - 10 %    EOSINOPHILS 0 0 - 5 %    BASOPHILS 0 0 - 2 %    ABS. NEUTROPHILS 16.2 (H) 1.8 - 8.0 K/UL    ABS. LYMPHOCYTES 2.4 0.9 - 3.6 K/UL    ABS. MONOCYTES 1.0 0.05 - 1.2 K/UL    ABS. EOSINOPHILS 0.0 0.0 - 0.4 K/UL    ABS.  BASOPHILS 0.0 0.0 - 0.1 K/UL    DF AUTOMATED     METABOLIC PANEL, COMPREHENSIVE    Collection Time: 07/20/20  5:07 PM   Result Value Ref Range    Sodium 139 136 - 145 mmol/L    Potassium 3.5 3.5 - 5.5 mmol/L    Chloride 108 100 - 111 mmol/L    CO2 25 21 - 32 mmol/L    Anion gap 6 3.0 - 18 mmol/L    Glucose 121 (H) 74 - 99 mg/dL    BUN 13 7.0 - 18 MG/DL    Creatinine 0.96 0.6 - 1.3 MG/DL    BUN/Creatinine ratio 14 12 - 20      GFR est AA >60 >60 ml/min/1.73m2    GFR est non-AA >60 >60 ml/min/1.73m2    Calcium 9.1 8.5 - 10.1 MG/DL    Bilirubin, total 0.6 0.2 - 1.0 MG/DL    ALT (SGPT) 43 13 - 56 U/L AST (SGOT) 23 10 - 38 U/L    Alk. phosphatase 112 45 - 117 U/L    Protein, total 9.0 (H) 6.4 - 8.2 g/dL    Albumin 4.2 3.4 - 5.0 g/dL    Globulin 4.8 (H) 2.0 - 4.0 g/dL    A-G Ratio 0.9 0.8 - 1.7     LIPASE    Collection Time: 07/20/20  5:07 PM   Result Value Ref Range    Lipase 66 (L) 73 - 393 U/L   HCG QL SERUM    Collection Time: 07/20/20  5:07 PM   Result Value Ref Range    HCG, Ql. Negative NEG     EKG, 12 LEAD, INITIAL    Collection Time: 07/20/20  5:24 PM   Result Value Ref Range    Ventricular Rate 65 BPM    Atrial Rate 65 BPM    P-R Interval 108 ms    QRS Duration 84 ms    Q-T Interval 396 ms    QTC Calculation (Bezet) 411 ms    Calculated P Axis 39 degrees    Calculated R Axis 83 degrees    Calculated T Axis 58 degrees    Diagnosis       Sinus rhythm with short W[place]  Otherwise normal ECG  No previous ECGs available         Radiologic Studies -   CT ABD PELV W CONT    (Results Pending)     CT Results  (Last 48 hours)    None        CXR Results  (Last 48 hours)    None          Medical Decision Making   I am the first provider for this patient. I reviewed the vital signs, available nursing notes, past medical history, past surgical history, family history and social history. Vital Signs-Reviewed the patient's vital signs. Patient Vitals for the past 12 hrs:   Temp Pulse Resp BP SpO2   07/20/20 1900  69  107/66    07/20/20 1830  69  107/55    07/20/20 1820  75  121/71    07/20/20 1800  86 18 110/69    07/20/20 1750  70 20 113/85    07/20/20 1740  90 19 114/69    07/20/20 1730  66 26 120/81    07/20/20 1643 97.9 °F (36.6 °C) 83 15 (!) 150/104 100 %         Records Reviewed: Nursing Notes and Old Medical Records    Provider Notes (Medical Decision Making):   DDx: Cyclical vomiting, dehydration, electrolyte abnormality, SBO, UTI, Constipation, colitis    32 yo F who presents for cyclical vomiting that began earlier today. She has cyclical vomiting. Pt also reports constipation. Leukocytosis. CT scan shows possible colitis. Will treat with Cipro and Flagyl. Discussed with patient marijuana is likely the cause of the cyclical vomiting. Upon discharge her vomiting is well controlled. Patient nontoxic-appearing, afebrile, not tachycardic. Patient stable for prompt outpatient follow-up with PCP in 1-2 days. Patient given strict instructions to return if symptoms do not improve or worsen. ED Course:   Initial assessment performed. The patients presenting problems have been discussed, and they are in agreement with the care plan formulated and outlined with them. I have encouraged them to ask questions as they arise throughout their visit. Vomiting has subsided upon discharge. Disposition:  9:27 PM  Discussed lab and imaging results with pt along with dx and treatment plan. Discussed importance of PCP follow up. All questions answered. Pt voiced they understood. Return if sx worsen. PLAN:  1. Current Discharge Medication List      START taking these medications    Details   ciprofloxacin HCl (Cipro) 500 mg tablet Take 1 Tab by mouth two (2) times a day for 7 days. Qty: 14 Tab, Refills: 0      metroNIDAZOLE (FlagyL) 500 mg tablet Take 1 Tab by mouth three (3) times daily for 7 days. Qty: 21 Tab, Refills: 0      senna (Senna) 8.6 mg tablet Take 1 Tab by mouth daily. Qty: 20 Tab, Refills: 0      polyethylene glycol (Miralax) 17 gram/dose powder Take 17 g by mouth daily. 1 tablespoon with 8 oz of water daily  Qty: 116 g, Refills: 0           2.    Follow-up Information     Follow up With Specialties Details Why Contact Info    Pelon Sahu NP Nurse Practitioner Schedule an appointment as soon as possible for a visit in 1 day  [address] [address] [facility] [address] [phone]      [facility] EMERGENCY DEPT Emergency Medicine  If symptoms worsen [address]  [phone]        Return to ED if worse     Diagnosis     Clinical Impression:   1. Cyclical vomiting    2. Colitis        Attestations:    AMBERLY Marx    Please note that this dictation was completed with [employer], the computer voice recognition software. Quite often unanticipated grammatical, syntax, homophones, and other interpretive errors are inadvertently transcribed by the computer software. Please disregard these errors. Please excuse any errors that have escaped final proofreading. Thank you.

## 2020-07-20 NOTE — ED TRIAGE NOTES
Pt presents for cyclic vomiting. States she takes Elavil and is having an episode bc she hasnt had a BM.  Cannot remember last BM

## 2020-07-21 LAB
ATRIAL RATE: 65 BPM
CALCULATED P AXIS, ECG09: 39 DEGREES
CALCULATED R AXIS, ECG10: 83 DEGREES
CALCULATED T AXIS, ECG11: 58 DEGREES
DIAGNOSIS, 93000: NORMAL
P-R INTERVAL, ECG05: 108 MS
Q-T INTERVAL, ECG07: 396 MS
QRS DURATION, ECG06: 84 MS
QTC CALCULATION (BEZET), ECG08: 411 MS
VENTRICULAR RATE, ECG03: 65 BPM

## 2020-07-21 PROCEDURE — 99284 EMERGENCY DEPT VISIT MOD MDM: CPT

## 2020-07-21 PROCEDURE — 96375 TX/PRO/DX INJ NEW DRUG ADDON: CPT

## 2020-07-21 PROCEDURE — 96365 THER/PROPH/DIAG IV INF INIT: CPT

## 2020-07-21 NOTE — DISCHARGE INSTRUCTIONS
Patient Education     Colitis: Care Instructions  Your Care Instructions  Colitis is the medical term for swelling (inflammation) of the intestine. It can be caused by different things, such as an infection or loss of blood flow in the intestine. Other causes are problems like Crohn's disease or ulcerative colitis. Symptoms may include fever, diarrhea that may be bloody, or belly pain. Sometimes symptoms go away without treatment. But you may need treatment or more tests, such as blood tests or a stool test. Or you may need imaging tests like a CT scan or a colonoscopy. In some cases, the doctor may want to test a sample of tissue from the intestine. This test is called a biopsy. The doctor has checked you carefully, but problems can develop later. If you notice any problems or new symptoms, get medical treatment right away. Follow-up care is a key part of your treatment and safety. Be sure to make and go to all appointments, and call your doctor if you are having problems. It's also a good idea to know your test results and keep a list of the medicines you take. How can you care for yourself at home? · Rest until you feel better. · Your doctor may recommend that you eat bland foods. These include rice, dry toast or crackers, bananas, and applesauce. · To prevent dehydration, drink plenty of fluids. Choose water and other caffeine-free clear liquids until you feel better. If you have kidney, heart, or liver disease and have to limit fluids, talk with your doctor before you increase the amount of fluids you drink. · Be safe with medicines. Take your medicines exactly as prescribed. Call your doctor if you think you are having a problem with your medicine. You will get more details on the specific medicines your doctor prescribes. When should you call for help? Call 911 anytime you think you may need emergency care. For example, call if:  · You passed out (lost consciousness).   · You vomit blood or what looks like coffee grounds. · Your stools are maroon or very bloody. Call your doctor now or seek immediate medical care if:  · You have new or worse pain. · You have a new or higher fever. · You have new or worse symptoms. · You cannot keep fluids or medicines down. Watch closely for changes in your health, and be sure to contact your doctor if:  · You do not get better as expected. Where can you learn more? Go to MOBITRAC.be  Enter [id number] in the search box to learn more about \"Colitis: Care Instructions. \"   © 6696-9708 Healthwise, [employer]. Care instructions adapted under license by [employer] (which disclaims liability or warranty for this information). This care instruction is for use with your licensed healthcare professional. If you have questions about a medical condition or this instruction, always ask your healthcare professional. [address] any warranty or liability for your use of this information. Content Version: 54.9.633822; Current as of: November 20, 2015         Patient Education        Learning About Cyclic Vomiting Syndrome  What is it? An adult or child who has cyclic vomiting syndrome (CVS) has repeated bouts of severe vomiting and nausea. Between the vomiting episodes, the person's health is normal. The cause of CVS isn't known, but it may be related to migraine headaches. What happens when you have CVS?  CVS causes severe nausea, vomiting, and drooling. You may not be able to walk or talk during an episode. You may look pale. You may have a fever and feel very tired and thirsty. Some people with CVS have belly pain and diarrhea. Bouts of vomiting can last for a few hours or a few days. People with this condition tend to have a typical pattern of attacks. For example, one person may have bouts of CVS 4 times a year and always in the morning. Another person may have 8 bouts a year and always in the evening.   Some people with CVS have triggers that set off the vomiting. People have reported an infection, such as a cold, as a trigger. Other triggers include stress, menstrual cycles, and certain foods like chocolate or cheese. Children tend to have more triggers than adults do. How is CVS treated? Treatment is centered around easing the nausea and vomiting. The doctor may prescribe medicine. During very bad bouts of vomiting, your doctor may want you to stay in the hospital for a while. You may get fluids through a vein (IV) to prevent dehydration. Dehydration can be serious. Your body needs fluids to make enough blood. Without a good supply of blood, vital organs such as the heart and brain can't work as well as they should. When should you call for help? Call your doctor now or seek immediate medical care if:  · You have new or worse belly pain. · You have a fever. · You are vomiting. · You cannot pass stools or gas. · You have symptoms of dehydration, such as:  ? Dry eyes and a dry mouth. ? Passing only a little urine. ? Feeling thirstier than usual.  Watch closely for changes in your health, and be sure to contact your doctor if you have any problems. Follow-up care is a key part of your treatment and safety. Be sure to make and go to all appointments, and call your doctor if you are having problems. It's also a good idea to know your test results and keep a list of the medicines you take. Where can you learn more? Go to http://blake-safia.info/  Enter G167 in the search box to learn more about \"Learning About Cyclic Vomiting Syndrome. \"  Current as of: August 12, 2019               Content Version: 12.5  © 2468-4764 Healthwise, Incorporated. Care instructions adapted under license by [employer] (which disclaims liability or warranty for this information).  If you have questions about a medical condition or this instruction, always ask your healthcare professional. [employer] disclaims any warranty or liability for your use of this information.

## 2020-07-21 NOTE — ED NOTES
Patient awake and alert. Patient complains of abdominal pain-  made aware. Patient assisted to comfortable position and warm blanket- Patient placed on cardiac monitor , BP and pulse ox continuous.

## 2020-07-22 ENCOUNTER — APPOINTMENT (OUTPATIENT)
Dept: GENERAL RADIOLOGY | Age: 30
DRG: 103 | End: 2020-07-22
Attending: INTERNAL MEDICINE
Payer: OTHER GOVERNMENT

## 2020-07-22 ENCOUNTER — HOSPITAL ENCOUNTER (INPATIENT)
Age: 30
LOS: 3 days | Discharge: HOME | DRG: 103 | End: 2020-07-25
Attending: EMERGENCY MEDICINE | Admitting: INTERNAL MEDICINE
Payer: OTHER GOVERNMENT

## 2020-07-22 ENCOUNTER — APPOINTMENT (OUTPATIENT)
Dept: CT IMAGING | Age: 30
DRG: 103 | End: 2020-07-22
Attending: EMERGENCY MEDICINE
Payer: OTHER GOVERNMENT

## 2020-07-22 DIAGNOSIS — K52.9 ACUTE COLITIS: ICD-10-CM

## 2020-07-22 DIAGNOSIS — R11.15 CYCLIC VOMITING SYNDROME: ICD-10-CM

## 2020-07-22 DIAGNOSIS — R10.84 ACUTE GENERALIZED ABDOMINAL PAIN: Primary | ICD-10-CM

## 2020-07-22 DIAGNOSIS — R11.2 NON-INTRACTABLE VOMITING WITH NAUSEA, UNSPECIFIED VOMITING TYPE: ICD-10-CM

## 2020-07-22 PROBLEM — N39.0 UTI (URINARY TRACT INFECTION): Status: ACTIVE | Noted: 2020-07-22

## 2020-07-22 LAB
ALBUMIN SERPL-MCNC: 4.3 G/DL (ref 3.4–5)
ALBUMIN/GLOB SERPL: 1.1 {RATIO} (ref 0.8–1.7)
ALP SERPL-CCNC: 101 U/L (ref 45–117)
ALT SERPL-CCNC: 45 U/L (ref 13–56)
ANION GAP SERPL CALC-SCNC: 6 MMOL/L (ref 3–18)
APPEARANCE UR: CLEAR
AST SERPL-CCNC: 33 U/L (ref 10–38)
BACTERIA URNS QL MICRO: ABNORMAL /HPF
BASOPHILS # BLD: 0.1 K/UL (ref 0–0.1)
BASOPHILS NFR BLD: 0 % (ref 0–2)
BILIRUB SERPL-MCNC: 0.4 MG/DL (ref 0.2–1)
BILIRUB UR QL: NEGATIVE
BUN SERPL-MCNC: 11 MG/DL (ref 7–18)
BUN/CREAT SERPL: 13 (ref 12–20)
CALCIUM SERPL-MCNC: 8.6 MG/DL (ref 8.5–10.1)
CHLORIDE SERPL-SCNC: 109 MMOL/L (ref 100–111)
CO2 SERPL-SCNC: 25 MMOL/L (ref 21–32)
COLOR UR: ABNORMAL
CREAT SERPL-MCNC: 0.83 MG/DL (ref 0.6–1.3)
DIFFERENTIAL METHOD BLD: ABNORMAL
EOSINOPHIL # BLD: 0.1 K/UL (ref 0–0.4)
EOSINOPHIL NFR BLD: 0 % (ref 0–5)
EPITH CASTS URNS QL MICRO: ABNORMAL /LPF (ref 0–5)
ERYTHROCYTE [DISTWIDTH] IN BLOOD BY AUTOMATED COUNT: 13.7 % (ref 11.6–14.5)
GLOBULIN SER CALC-MCNC: 3.9 G/DL (ref 2–4)
GLUCOSE SERPL-MCNC: 102 MG/DL (ref 74–99)
GLUCOSE UR STRIP.AUTO-MCNC: NEGATIVE MG/DL
HCG SERPL QL: NEGATIVE
HCT VFR BLD AUTO: 38.7 % (ref 35–45)
HGB BLD-MCNC: 12.4 G/DL (ref 12–16)
HGB UR QL STRIP: ABNORMAL
KETONES UR QL STRIP.AUTO: 40 MG/DL
LACTATE SERPL-SCNC: 1.6 MMOL/L (ref 0.4–2)
LEUKOCYTE ESTERASE UR QL STRIP.AUTO: ABNORMAL
LIPASE SERPL-CCNC: 87 U/L (ref 73–393)
LYMPHOCYTES # BLD: 2.5 K/UL (ref 0.9–3.6)
LYMPHOCYTES NFR BLD: 13 % (ref 21–52)
MCH RBC QN AUTO: 29.7 PG (ref 24–34)
MCHC RBC AUTO-ENTMCNC: 32 G/DL (ref 31–37)
MCV RBC AUTO: 92.8 FL (ref 74–97)
MONOCYTES # BLD: 1.2 K/UL (ref 0.05–1.2)
MONOCYTES NFR BLD: 6 % (ref 3–10)
NEUTS SEG # BLD: 16.4 K/UL (ref 1.8–8)
NEUTS SEG NFR BLD: 81 % (ref 40–73)
NITRITE UR QL STRIP.AUTO: NEGATIVE
PH UR STRIP: 5 [PH] (ref 5–8)
PLATELET # BLD AUTO: 409 K/UL (ref 135–420)
PMV BLD AUTO: 10.9 FL (ref 9.2–11.8)
POTASSIUM SERPL-SCNC: 3.5 MMOL/L (ref 3.5–5.5)
PROT SERPL-MCNC: 8.2 G/DL (ref 6.4–8.2)
PROT UR STRIP-MCNC: NEGATIVE MG/DL
RBC # BLD AUTO: 4.17 M/UL (ref 4.2–5.3)
RBC #/AREA URNS HPF: ABNORMAL /HPF (ref 0–5)
SODIUM SERPL-SCNC: 140 MMOL/L (ref 136–145)
SP GR UR REFRACTOMETRY: >1.03 (ref 1–1.03)
UROBILINOGEN UR QL STRIP.AUTO: 0.2 EU/DL (ref 0.2–1)
WBC # BLD AUTO: 20.2 K/UL (ref 4.6–13.2)
WBC URNS QL MICRO: ABNORMAL /HPF (ref 0–4)

## 2020-07-22 PROCEDURE — 84703 CHORIONIC GONADOTROPIN ASSAY: CPT

## 2020-07-22 PROCEDURE — 83605 ASSAY OF LACTIC ACID: CPT

## 2020-07-22 PROCEDURE — 74011636320 HC RX REV CODE- 636/320: Performed by: EMERGENCY MEDICINE

## 2020-07-22 PROCEDURE — 80053 COMPREHEN METABOLIC PANEL: CPT

## 2020-07-22 PROCEDURE — 65270000029 HC RM PRIVATE

## 2020-07-22 PROCEDURE — 74011250636 HC RX REV CODE- 250/636: Performed by: EMERGENCY MEDICINE

## 2020-07-22 PROCEDURE — 74011250636 HC RX REV CODE- 250/636: Performed by: INTERNAL MEDICINE

## 2020-07-22 PROCEDURE — 85025 COMPLETE CBC W/AUTO DIFF WBC: CPT

## 2020-07-22 PROCEDURE — 74011250637 HC RX REV CODE- 250/637: Performed by: HOSPITALIST

## 2020-07-22 PROCEDURE — 83690 ASSAY OF LIPASE: CPT

## 2020-07-22 PROCEDURE — 81001 URINALYSIS AUTO W/SCOPE: CPT

## 2020-07-22 PROCEDURE — 87086 URINE CULTURE/COLONY COUNT: CPT

## 2020-07-22 PROCEDURE — 87040 BLOOD CULTURE FOR BACTERIA: CPT

## 2020-07-22 PROCEDURE — 74011250637 HC RX REV CODE- 250/637: Performed by: INTERNAL MEDICINE

## 2020-07-22 PROCEDURE — 74177 CT ABD & PELVIS W/CONTRAST: CPT

## 2020-07-22 PROCEDURE — 71045 X-RAY EXAM CHEST 1 VIEW: CPT

## 2020-07-22 PROCEDURE — C9113 INJ PANTOPRAZOLE SODIUM, VIA: HCPCS | Performed by: EMERGENCY MEDICINE

## 2020-07-22 RX ORDER — LEVETIRACETAM 750 MG/1
3000 TABLET, EXTENDED RELEASE ORAL
Status: DISCONTINUED | OUTPATIENT
Start: 2020-07-22 | End: 2020-07-22 | Stop reason: CLARIF

## 2020-07-22 RX ORDER — AMITRIPTYLINE HYDROCHLORIDE 50 MG/1
100 TABLET, FILM COATED ORAL
Status: DISCONTINUED | OUTPATIENT
Start: 2020-07-22 | End: 2020-07-25 | Stop reason: HOSPADM

## 2020-07-22 RX ORDER — PANTOPRAZOLE SODIUM 40 MG/10ML
40 INJECTION, POWDER, LYOPHILIZED, FOR SOLUTION INTRAVENOUS
Status: COMPLETED | OUTPATIENT
Start: 2020-07-22 | End: 2020-07-22

## 2020-07-22 RX ORDER — LEVOTHYROXINE SODIUM 100 UG/1
100 TABLET ORAL
Status: DISCONTINUED | OUTPATIENT
Start: 2020-07-23 | End: 2020-07-25 | Stop reason: HOSPADM

## 2020-07-22 RX ORDER — PANTOPRAZOLE SODIUM 40 MG/10ML
40 INJECTION, POWDER, LYOPHILIZED, FOR SOLUTION INTRAVENOUS DAILY PRN
Status: DISCONTINUED | OUTPATIENT
Start: 2020-07-22 | End: 2020-07-24

## 2020-07-22 RX ORDER — MORPHINE SULFATE 4 MG/ML
4 INJECTION, SOLUTION INTRAMUSCULAR; INTRAVENOUS
Status: COMPLETED | OUTPATIENT
Start: 2020-07-22 | End: 2020-07-22

## 2020-07-22 RX ORDER — DIPHENHYDRAMINE HYDROCHLORIDE 50 MG/ML
12.5 INJECTION, SOLUTION INTRAMUSCULAR; INTRAVENOUS
Status: COMPLETED | OUTPATIENT
Start: 2020-07-22 | End: 2020-07-22

## 2020-07-22 RX ORDER — LEVETIRACETAM 500 MG/1
1500 TABLET ORAL 2 TIMES DAILY
Status: DISCONTINUED | OUTPATIENT
Start: 2020-07-22 | End: 2020-07-25 | Stop reason: HOSPADM

## 2020-07-22 RX ORDER — KETOROLAC TROMETHAMINE 30 MG/ML
30 INJECTION, SOLUTION INTRAMUSCULAR; INTRAVENOUS
Status: COMPLETED | OUTPATIENT
Start: 2020-07-22 | End: 2020-07-22

## 2020-07-22 RX ORDER — HYDROMORPHONE HYDROCHLORIDE 1 MG/ML
1 INJECTION, SOLUTION INTRAMUSCULAR; INTRAVENOUS; SUBCUTANEOUS
Status: COMPLETED | OUTPATIENT
Start: 2020-07-22 | End: 2020-07-22

## 2020-07-22 RX ORDER — ONDANSETRON 2 MG/ML
4 INJECTION INTRAMUSCULAR; INTRAVENOUS
Status: DISCONTINUED | OUTPATIENT
Start: 2020-07-22 | End: 2020-07-25 | Stop reason: HOSPADM

## 2020-07-22 RX ORDER — PROCHLORPERAZINE EDISYLATE 5 MG/ML
10 INJECTION INTRAMUSCULAR; INTRAVENOUS
Status: DISCONTINUED | OUTPATIENT
Start: 2020-07-22 | End: 2020-07-25 | Stop reason: HOSPADM

## 2020-07-22 RX ORDER — METOCLOPRAMIDE HYDROCHLORIDE 5 MG/ML
5 INJECTION INTRAMUSCULAR; INTRAVENOUS
Status: COMPLETED | OUTPATIENT
Start: 2020-07-22 | End: 2020-07-22

## 2020-07-22 RX ORDER — LEVOTHYROXINE SODIUM 88 UG/1
88 TABLET ORAL
Status: DISCONTINUED | OUTPATIENT
Start: 2020-07-25 | End: 2020-07-22

## 2020-07-22 RX ORDER — ONDANSETRON 2 MG/ML
4 INJECTION INTRAMUSCULAR; INTRAVENOUS
Status: COMPLETED | OUTPATIENT
Start: 2020-07-22 | End: 2020-07-22

## 2020-07-22 RX ORDER — HALOPERIDOL 5 MG/ML
5 INJECTION INTRAMUSCULAR
Status: COMPLETED | OUTPATIENT
Start: 2020-07-22 | End: 2020-07-22

## 2020-07-22 RX ORDER — MORPHINE SULFATE 2 MG/ML
2-4 INJECTION, SOLUTION INTRAMUSCULAR; INTRAVENOUS
Status: DISCONTINUED | OUTPATIENT
Start: 2020-07-22 | End: 2020-07-22

## 2020-07-22 RX ORDER — IPRATROPIUM BROMIDE AND ALBUTEROL SULFATE 2.5; .5 MG/3ML; MG/3ML
3 SOLUTION RESPIRATORY (INHALATION)
Status: DISCONTINUED | OUTPATIENT
Start: 2020-07-22 | End: 2020-07-25 | Stop reason: HOSPADM

## 2020-07-22 RX ORDER — ACETAMINOPHEN 325 MG/1
650 TABLET ORAL
Status: DISCONTINUED | OUTPATIENT
Start: 2020-07-22 | End: 2020-07-25 | Stop reason: HOSPADM

## 2020-07-22 RX ORDER — OXYCODONE AND ACETAMINOPHEN 5; 325 MG/1; MG/1
1 TABLET ORAL
Status: DISCONTINUED | OUTPATIENT
Start: 2020-07-22 | End: 2020-07-24

## 2020-07-22 RX ORDER — MORPHINE SULFATE 2 MG/ML
2 INJECTION, SOLUTION INTRAMUSCULAR; INTRAVENOUS
Status: DISCONTINUED | OUTPATIENT
Start: 2020-07-22 | End: 2020-07-24

## 2020-07-22 RX ORDER — SODIUM CHLORIDE 9 MG/ML
125 INJECTION, SOLUTION INTRAVENOUS CONTINUOUS
Status: DISPENSED | OUTPATIENT
Start: 2020-07-22 | End: 2020-07-25

## 2020-07-22 RX ORDER — LANOLIN ALCOHOL/MO/W.PET/CERES
12 CREAM (GRAM) TOPICAL
Status: DISCONTINUED | OUTPATIENT
Start: 2020-07-22 | End: 2020-07-25 | Stop reason: HOSPADM

## 2020-07-22 RX ORDER — LEVOTHYROXINE SODIUM 100 UG/1
100 TABLET ORAL
COMMUNITY

## 2020-07-22 RX ORDER — LORAZEPAM 2 MG/ML
0.5 INJECTION INTRAMUSCULAR
Status: DISCONTINUED | OUTPATIENT
Start: 2020-07-22 | End: 2020-07-22

## 2020-07-22 RX ORDER — METRONIDAZOLE 500 MG/100ML
500 INJECTION, SOLUTION INTRAVENOUS EVERY 8 HOURS
Status: DISCONTINUED | OUTPATIENT
Start: 2020-07-22 | End: 2020-07-23

## 2020-07-22 RX ORDER — LEVOFLOXACIN 5 MG/ML
750 INJECTION, SOLUTION INTRAVENOUS EVERY 24 HOURS
Status: DISCONTINUED | OUTPATIENT
Start: 2020-07-22 | End: 2020-07-25 | Stop reason: HOSPADM

## 2020-07-22 RX ORDER — LEVOTHYROXINE SODIUM 88 UG/1
88 TABLET ORAL
Status: DISCONTINUED | OUTPATIENT
Start: 2020-07-25 | End: 2020-07-25 | Stop reason: HOSPADM

## 2020-07-22 RX ORDER — DIPHENHYDRAMINE HYDROCHLORIDE 50 MG/ML
25 INJECTION, SOLUTION INTRAMUSCULAR; INTRAVENOUS
Status: COMPLETED | OUTPATIENT
Start: 2020-07-22 | End: 2020-07-22

## 2020-07-22 RX ORDER — DOCUSATE SODIUM 100 MG/1
100 CAPSULE, LIQUID FILLED ORAL
Status: DISCONTINUED | OUTPATIENT
Start: 2020-07-22 | End: 2020-07-25 | Stop reason: HOSPADM

## 2020-07-22 RX ADMIN — LEVETIRACETAM 1500 MG: 500 TABLET ORAL at 17:57

## 2020-07-22 RX ADMIN — ONDANSETRON 4 MG: 2 SOLUTION INTRAMUSCULAR; INTRAVENOUS at 01:12

## 2020-07-22 RX ADMIN — IOPAMIDOL 100 ML: 612 INJECTION, SOLUTION INTRAVENOUS at 01:33

## 2020-07-22 RX ADMIN — MELATONIN 12 MG: at 22:28

## 2020-07-22 RX ADMIN — MORPHINE SULFATE 2 MG: 2 INJECTION, SOLUTION INTRAMUSCULAR; INTRAVENOUS at 12:36

## 2020-07-22 RX ADMIN — SODIUM CHLORIDE 1000 ML: 900 INJECTION, SOLUTION INTRAVENOUS at 00:53

## 2020-07-22 RX ADMIN — METRONIDAZOLE 500 MG: 500 INJECTION, SOLUTION INTRAVENOUS at 17:07

## 2020-07-22 RX ADMIN — HYDROMORPHONE HYDROCHLORIDE 1 MG: 1 INJECTION, SOLUTION INTRAMUSCULAR; INTRAVENOUS; SUBCUTANEOUS at 02:32

## 2020-07-22 RX ADMIN — METRONIDAZOLE 500 MG: 500 INJECTION, SOLUTION INTRAVENOUS at 08:19

## 2020-07-22 RX ADMIN — SODIUM CHLORIDE 125 ML/HR: 900 INJECTION, SOLUTION INTRAVENOUS at 12:12

## 2020-07-22 RX ADMIN — MORPHINE SULFATE 2 MG: 2 INJECTION, SOLUTION INTRAMUSCULAR; INTRAVENOUS at 22:28

## 2020-07-22 RX ADMIN — METOCLOPRAMIDE 5 MG: 5 INJECTION, SOLUTION INTRAMUSCULAR; INTRAVENOUS at 00:45

## 2020-07-22 RX ADMIN — SODIUM CHLORIDE 1000 ML: 900 INJECTION, SOLUTION INTRAVENOUS at 00:54

## 2020-07-22 RX ADMIN — ONDANSETRON 4 MG: 2 INJECTION INTRAMUSCULAR; INTRAVENOUS at 08:16

## 2020-07-22 RX ADMIN — ONDANSETRON 4 MG: 2 INJECTION INTRAMUSCULAR; INTRAVENOUS at 12:20

## 2020-07-22 RX ADMIN — KETOROLAC TROMETHAMINE 30 MG: 30 INJECTION, SOLUTION INTRAMUSCULAR at 00:45

## 2020-07-22 RX ADMIN — HALOPERIDOL LACTATE 5 MG: 5 INJECTION, SOLUTION INTRAMUSCULAR at 04:10

## 2020-07-22 RX ADMIN — MORPHINE SULFATE 4 MG: 4 INJECTION, SOLUTION INTRAMUSCULAR; INTRAVENOUS at 00:45

## 2020-07-22 RX ADMIN — DIPHENHYDRAMINE HYDROCHLORIDE 25 MG: 50 INJECTION, SOLUTION INTRAMUSCULAR; INTRAVENOUS at 04:10

## 2020-07-22 RX ADMIN — SODIUM CHLORIDE 125 ML/HR: 900 INJECTION, SOLUTION INTRAVENOUS at 21:11

## 2020-07-22 RX ADMIN — AMITRIPTYLINE HYDROCHLORIDE 100 MG: 50 TABLET, FILM COATED ORAL at 22:27

## 2020-07-22 RX ADMIN — METOCLOPRAMIDE 5 MG: 5 INJECTION, SOLUTION INTRAMUSCULAR; INTRAVENOUS at 02:33

## 2020-07-22 RX ADMIN — ONDANSETRON 4 MG: 2 INJECTION INTRAMUSCULAR; INTRAVENOUS at 19:36

## 2020-07-22 RX ADMIN — DIPHENHYDRAMINE HYDROCHLORIDE 12.5 MG: 50 INJECTION, SOLUTION INTRAMUSCULAR; INTRAVENOUS at 00:45

## 2020-07-22 RX ADMIN — SODIUM CHLORIDE 1000 ML: 900 INJECTION, SOLUTION INTRAVENOUS at 02:34

## 2020-07-22 RX ADMIN — MORPHINE SULFATE 2 MG: 2 INJECTION, SOLUTION INTRAMUSCULAR; INTRAVENOUS at 08:16

## 2020-07-22 RX ADMIN — OXYCODONE HYDROCHLORIDE AND ACETAMINOPHEN 1 TABLET: 5; 325 TABLET ORAL at 20:37

## 2020-07-22 RX ADMIN — LEVOFLOXACIN 750 MG: 5 INJECTION, SOLUTION INTRAVENOUS at 02:33

## 2020-07-22 RX ADMIN — MORPHINE SULFATE 2 MG: 2 INJECTION, SOLUTION INTRAMUSCULAR; INTRAVENOUS at 17:07

## 2020-07-22 RX ADMIN — METRONIDAZOLE 500 MG: 500 INJECTION, SOLUTION INTRAVENOUS at 02:33

## 2020-07-22 RX ADMIN — PANTOPRAZOLE SODIUM 40 MG: 40 INJECTION, POWDER, FOR SOLUTION INTRAVENOUS at 02:33

## 2020-07-22 NOTE — ED TRIAGE NOTES
Pt to ED via EMS with complaints of generalized abdominal pain, nausea, and vomiting that started two days ago. Pt reports being diagnosed with colitis in the ED yesterday.

## 2020-07-22 NOTE — CONSULTS
General Surgery Consult    Alexandru Schneider  Admit date: 2020    MRN: [id number]     : 1990     Age: 31 y.o. Attending Physician: Radha Lux MD [facility]      History of Present Illness:      Alexandru Schneider is a 31 y.o. female who I was consulted for evaluation for possible colitis. The patient is currently in the emergency room but she is already being admitted to the medicine service for evaluation and treatment. The patient was here yesterday with her abdominal pain as well with nausea and vomiting and she was sent home on p.o. antibiotics but because she did not get better she came back to the emergency room. Interestingly when I spoke with the patient in detail about her history she said that she has been having chronic abdominal pain with nausea and vomiting for more than 10 years now. She said that she moved to [place] last year and she stated that she had a GI doctor and she has been diagnosed with what she called \"cyclic vomiting syndrome\" which she prescribed as significant abdominal pain with nausea and vomiting that happen frequently. She stated that it is a very rare syndrome according to her and very few knows about it. She said that she is usually developed abdominal pain that caused her to be nauseated and having vomiting sometimes. She said that her current episode has been there for about 2 weeks and she was supposed to see her PCP but when she called 2 days ago they told her that they are backed up and they asked her to go to an urgent care. However because she could not drive herself she was brought to the emergency room here at [facility]. This when she was seen yesterday and then she was seen again today for worsening of her abdominal pain and nausea and vomiting. The patient vital signs are normal with no fever but she had mild tachycardia in the 100 and now it is 93. She has significant leukocytosis yesterday it was 19.7 and today is the same 20.   Her creatinine and liver function test and lipase and lactate are normal.  On her CT scan from yesterday there was no clear evidence of colitis but there was some suspicion of colitis and today on the CT scan there was no evidence at all of any colitis. Patient Active Problem List    Diagnosis Date Noted    UTI (urinary tract infection) 07/22/2020    Nausea & vomiting 07/22/2020    Colitis, acute 07/22/2020    Epilepsy ([facility] [address] 35/60/4124    Cyclic vomiting syndrome 09/16/2019    GERD (gastroesophageal reflux disease) 09/16/2019    Hiatal hernia 09/16/2019    Acquired hypothyroidism 09/16/2019     Past Medical History:   Diagnosis Date    Cyclic vomiting syndrome     Epilepsy ([facility] [address]     GERD (gastroesophageal reflux disease)     Hiatal hernia       History reviewed. No pertinent surgical history.    Social History     Tobacco Use    Smoking status: Never Smoker    Smokeless tobacco: Never Used   Substance Use Topics    Alcohol use: Not Currently      Social History     Tobacco Use   Smoking Status Never Smoker   Smokeless Tobacco Never Used     Family History   Problem Relation Age of Onset    Osteoporosis Mother     Osteoporosis Maternal Grandmother       Current Facility-Administered Medications   Medication Dose Route Frequency    metroNIDAZOLE (FLAGYL) IVPB premix 500 mg  500 mg IntraVENous Q8H    levoFLOXacin (LEVAQUIN) 750 mg in D5W IVPB  750 mg IntraVENous Q24H    amitriptyline (ELAVIL) tablet 100 mg  100 mg Oral QHS    0.9% sodium chloride infusion  125 mL/hr IntraVENous CONTINUOUS    ondansetron (ZOFRAN) injection 4 mg  4 mg IntraVENous Q4H PRN    docusate sodium (COLACE) capsule 100 mg  100 mg Oral BID PRN    acetaminophen (TYLENOL) tablet 650 mg  650 mg Oral Q6H PRN    pantoprazole (PROTONIX) injection 40 mg  40 mg IntraVENous DAILY PRN    albuterol-ipratropium (DUO-NEB) 2.5 MG-0.5 MG/3 ML  3 mL Nebulization Q6H PRN    melatonin tablet 12 mg  12 mg Oral QHS PRN    oxyCODONE-acetaminophen (PERCOCET) 5-325 mg per tablet 1 Tab  1 Tab Oral Q4H PRN    morphine injection 2 mg  2 mg IntraVENous Q3H PRN    prochlorperazine (COMPAZINE) injection 10 mg  10 mg IntraVENous Q6H PRN    levETIRAcetam (KEPPRA) tablet 1,500 mg  1,500 mg Oral BID    [START ON 7/25/2020] levothyroxine (SYNTHROID) tablet 88 mcg  88 mcg Oral Once per day on Sun Sat    [START ON 7/23/2020] levothyroxine (SYNTHROID) tablet 100 mcg  100 mcg Oral Once per day on Mon Tue Wed Thu Fri     Current Outpatient Medications   Medication Sig    levothyroxine (SYNTHROID) 100 mcg tablet Take 100 mcg by mouth five (5) days a week. Monday - Friday    amitriptyline (ELAVIL) 100 mg tablet TAKE 1 TABLET NIGHTLY (Patient taking differently: 100 mg nightly.)    levETIRAcetam (KEPPRA XR) 750 mg ER tablet Take 1 Tab by mouth four (4) times daily. (Patient taking differently: Take 3,000 mg by mouth nightly.)    levothyroxine (SYNTHROID) 88 mcg tablet Take 1 Tab by mouth Daily (before breakfast). (Patient taking differently: Take 88 mcg by mouth two (2) days a week. Saturday and Sunday)    rizatriptan (MAXALT-MLT) 10 mg disintegrating tablet Take 10 mg by mouth once as needed for Migraine. Take 1 tablet by mouth at onset of headache and repeat in 2 hours as needed. Max 2 tablets in 25 hours or 10 tablets in 1 week.  ciprofloxacin HCl (Cipro) 500 mg tablet Take 1 Tab by mouth two (2) times a day for 7 days.  metroNIDAZOLE (FlagyL) 500 mg tablet Take 1 Tab by mouth three (3) times daily for 7 days.  senna (Senna) 8.6 mg tablet Take 1 Tab by mouth daily.  polyethylene glycol (Miralax) 17 gram/dose powder Take 17 g by mouth daily. 1 tablespoon with 8 oz of water daily    levETIRAcetam (KEPPRA) 750 mg tablet Take 1 Tab by mouth four (4) times daily.  ubidecarenone (COQ-10 PO) Take  by mouth.  riboflavin, vitamin B2, 400 mg tab Take  by mouth.  cholecalciferol, vitamin D3, (VITAMIN D3 PO) Take  by mouth.  OTHER 400 mg.  Indications: julio c-mag    fluticasone propionate (Flonase Allergy Relief) 50 mcg/actuation nasal spray 2 Sprays by Both Nostrils route daily as needed for Rhinitis.  ondansetron (ZOFRAN ODT) 8 mg disintegrating tablet Take 8 mg by mouth every eight (8) hours as needed for Nausea.  levonorgestrel (MIRENA) 20 mcg/24 hours (5 yrs) 52 mg IUD 1 Device by IntraUTERine route once. Allergies   Allergen Reactions    Milk Containing Products Anaphylaxis    Sulfa (Sulfonamide Antibiotics) Anaphylaxis    Ciprofloxacin Nausea and Vomiting    Codeine Nausea and Vomiting    Doxycycline Hives    Gluten Swelling    Penicillins Hives        Review of Systems:  Constitutional: negative  Eyes: negative  Ears, Nose, Mouth, Throat, and Face: negative  Respiratory: negative  Cardiovascular: negative  Gastrointestinal: positive for nausea, vomiting and abdominal pain  Genitourinary:negative  Integument/Breast: negative  Hematologic/Lymphatic: negative  Musculoskeletal:negative  Neurological: negative  Behavioral/Psychiatric: negative  Endocrine: negative  Allergic/Immunologic: negative    Objective:     Visit Vitals  /63 (BP 1 Location: Left arm, BP Patient Position: At rest)   Pulse 93   Temp 97.9 °F (36.6 °C)   Resp 16   Ht 5' 2\" (1.575 m)   Wt 77.1 kg (170 lb)   LMP 07/20/2020   SpO2 100%   BMI 31.09 kg/m²       Physical Exam:      General:  in no apparent distress, alert, oriented times 3, afebrile, normal vitals and cooperative   Eyes:  conjunctivae and sclerae normal, pupils equal, round, reactive to light   Throat & Neck: no erythema or exudates noted and neck supple and symmetrical; no palpable masses   Lungs:   clear to auscultation bilaterally   Heart:  Regular rate and rhythm   Abdomen:   rounded, soft, nontender, nondistended, no masses or organomegaly. No evidence of abdominal wall hernias.    Extremities: extremities normal, atraumatic, no cyanosis or edema   Skin: Normal.       Imaging and Lab Review:     CBC: Lab Results   Component Value Date/Time    WBC 20.2 (H) 07/22/2020 12:20 AM    RBC 4.17 (L) 07/22/2020 12:20 AM    HGB 12.4 07/22/2020 12:20 AM    HCT 38.7 07/22/2020 12:20 AM    PLATELET 417 76/96/6940 12:20 AM     BMP:   Lab Results   Component Value Date/Time    Glucose 102 (H) 07/22/2020 12:20 AM    Sodium 140 07/22/2020 12:20 AM    Potassium 3.5 07/22/2020 12:20 AM    Chloride 109 07/22/2020 12:20 AM    CO2 25 07/22/2020 12:20 AM    BUN 11 07/22/2020 12:20 AM    Creatinine 0.83 07/22/2020 12:20 AM    Calcium 8.6 07/22/2020 12:20 AM     CMP:  Lab Results   Component Value Date/Time    Glucose 102 (H) 07/22/2020 12:20 AM    Sodium 140 07/22/2020 12:20 AM    Potassium 3.5 07/22/2020 12:20 AM    Chloride 109 07/22/2020 12:20 AM    CO2 25 07/22/2020 12:20 AM    BUN 11 07/22/2020 12:20 AM    Creatinine 0.83 07/22/2020 12:20 AM    Calcium 8.6 07/22/2020 12:20 AM    Anion gap 6 07/22/2020 12:20 AM    BUN/Creatinine ratio 13 07/22/2020 12:20 AM    Alk. phosphatase 101 07/22/2020 12:20 AM    Protein, total 8.2 07/22/2020 12:20 AM    Albumin 4.3 07/22/2020 12:20 AM    Globulin 3.9 07/22/2020 12:20 AM    A-G Ratio 1.1 07/22/2020 12:20 AM       Recent Results (from the past 24 hour(s))   CBC WITH AUTOMATED DIFF    Collection Time: 07/22/20 12:20 AM   Result Value Ref Range    WBC 20.2 (H) 4.6 - 13.2 K/uL    RBC 4.17 (L) 4.20 - 5.30 M/uL    HGB 12.4 12.0 - 16.0 g/dL    HCT 38.7 35.0 - 45.0 %    MCV 92.8 74.0 - 97.0 FL    MCH 29.7 24.0 - 34.0 PG    MCHC 32.0 31.0 - 37.0 g/dL    RDW 13.7 11.6 - 14.5 %    PLATELET 577 370 - 788 K/uL    MPV 10.9 9.2 - 11.8 FL    NEUTROPHILS 81 (H) 40 - 73 %    LYMPHOCYTES 13 (L) 21 - 52 %    MONOCYTES 6 3 - 10 %    EOSINOPHILS 0 0 - 5 %    BASOPHILS 0 0 - 2 %    ABS. NEUTROPHILS 16.4 (H) 1.8 - 8.0 K/UL    ABS. LYMPHOCYTES 2.5 0.9 - 3.6 K/UL    ABS. MONOCYTES 1.2 0.05 - 1.2 K/UL    ABS. EOSINOPHILS 0.1 0.0 - 0.4 K/UL    ABS.  BASOPHILS 0.1 0.0 - 0.1 K/UL    DF AUTOMATED     METABOLIC PANEL, COMPREHENSIVE    Collection Time: 07/22/20 12:20 AM   Result Value Ref Range    Sodium 140 136 - 145 mmol/L    Potassium 3.5 3.5 - 5.5 mmol/L    Chloride 109 100 - 111 mmol/L    CO2 25 21 - 32 mmol/L    Anion gap 6 3.0 - 18 mmol/L    Glucose 102 (H) 74 - 99 mg/dL    BUN 11 7.0 - 18 MG/DL    Creatinine 0.83 0.6 - 1.3 MG/DL    BUN/Creatinine ratio 13 12 - 20      GFR est AA >60 >60 ml/min/1.73m2    GFR est non-AA >60 >60 ml/min/1.73m2    Calcium 8.6 8.5 - 10.1 MG/DL    Bilirubin, total 0.4 0.2 - 1.0 MG/DL    ALT (SGPT) 45 13 - 56 U/L    AST (SGOT) 33 10 - 38 U/L    Alk.  phosphatase 101 45 - 117 U/L    Protein, total 8.2 6.4 - 8.2 g/dL    Albumin 4.3 3.4 - 5.0 g/dL    Globulin 3.9 2.0 - 4.0 g/dL    A-G Ratio 1.1 0.8 - 1.7     LIPASE    Collection Time: 07/22/20 12:20 AM   Result Value Ref Range    Lipase 87 73 - 393 U/L   HCG QL SERUM    Collection Time: 07/22/20 12:20 AM   Result Value Ref Range    HCG, Ql. Negative NEG     CULTURE, BLOOD    Collection Time: 07/22/20  2:00 AM    Specimen: Blood   Result Value Ref Range    Special Requests: PERIPHERAL      Culture result: NO GROWTH AFTER 6 HOURS     CULTURE, BLOOD    Collection Time: 07/22/20  2:00 AM    Specimen: Blood   Result Value Ref Range    Special Requests: PERIPHERAL      Culture result: NO GROWTH AFTER 6 HOURS     LACTIC ACID    Collection Time: 07/22/20  2:15 AM   Result Value Ref Range    Lactic acid 1.6 0.4 - 2.0 MMOL/L   URINALYSIS W/ RFLX MICROSCOPIC    Collection Time: 07/22/20  2:20 AM   Result Value Ref Range    Color DARK YELLOW      Appearance CLEAR      Specific gravity >1.030 (H) 1.005 - 1.030    pH (UA) 5.0 5.0 - 8.0      Protein Negative NEG mg/dL    Glucose Negative NEG mg/dL    Ketone 40 (A) NEG mg/dL    Bilirubin Negative NEG      Blood MODERATE (A) NEG      Urobilinogen 0.2 0.2 - 1.0 EU/dL    Nitrites Negative NEG      Leukocyte Esterase TRACE (A) NEG     URINE MICROSCOPIC ONLY    Collection Time: 07/22/20  2:20 AM   Result Value Ref Range WBC 11 to 20 0 - 4 /hpf    RBC 0 to 3 0 - 5 /hpf    Epithelial cells 1+ 0 - 5 /lpf    Bacteria 1+ (A) NEG /hpf       images and reports reviewed    Assessment:   Maulik Ngo is a 31 y.o. female is presenting with long history of abdominal pain nausea and vomiting and she stated that she is diagnosed with cyclic vomiting syndrome. This episode has been lasting for about 2 weeks according to her and she is been having constipation as well. Her CT scan did not show evidence of colitis however she has leukocytosis. From the general surgery standpoint there is no need for any surgical intervention currently. I do recommend a GI consultation especially that the patient does not have a gastroenterologist and she need to establish one giving her long history of nausea abdominal pain and constipation. Plan:     I appreciate the medicine admission and management as well as the consultation  I do not see any acute surgical issue currently however her leukocytosis is concerning and need to be addressed.    CT scan did not show evidence of colitis but I think she should be on antibiotic given her leukocytosis  Consider GI consultation for sure for evaluation and management of her cyclic vomiting syndrome which I am not familiar with  N.p.o. for now  IV fluids  Repeat labs tomorrow  We will follow    Please call me if you have any questions (cell phone: [phone])     Signed By: Mg Painter MD     July 22, 2020

## 2020-07-22 NOTE — PROGRESS NOTES
Pharmacy: Non-Formulary Medication [facility]    Please note that medication levetiracetam XR 3000 mg qhs is not on the formulary and has been changed to levetiracetam 1500 mg BID. If patient prefers to take home medication, please have family member bring from home for pharmacy to verify. Please call pharmacy for questions.     Thanks,  Sydnie Rose, PharmD

## 2020-07-22 NOTE — PROGRESS NOTES
Problem: Discharge Planning  Goal: *Discharge to safe environment  Outcome: Progressing Towards Goal  Plan is home    Reason for Admission:  Abdominal  Pain, vomitting                   RUR Score:   Na- in ER                  Plan for utilizing home health:   TBD       PCP: First and Last name:  Dr Analy Golden at [facility]   Name of Practice:    Are you a current patient: Yes/No: yes   Approximate date of last visit:  About a month ago in person   Can you participate in a virtual visit with your PCP:                     Current Advanced Directive/Advance Care Plan: no and not interested now                         Transition of Care Plan:   Chart reviewed and pt verified demographics. [place], she is working with Felton Ngo to see if he can come home. Her plan is home. Her dad is emergency contact since her  is on deployment. Lalo Fuze- [phone]    Care Management Interventions  PCP Verified by CM: Yes  Palliative Care Criteria Met (RRAT>21 & CHF Dx)?: No  Mode of Transport at Discharge: Other (see comment)(Maybe  , maybe a friend)  Transition of Care Consult (CM Consult): Discharge Planning  Current Support Network: Lives with Spouse(he is out on deployment right now- pt is working with Felton Raecheli to see if he can come home)  Confirm Follow Up Transport: Other (see comment)  The Plan for Transition of Care is Related to the Following Treatment Goals : resolution of acute symtoms  Discharge Location  Discharge Placement: Home     JAEL Palma  [facility]  [phone], Pager [phone]  [email or web address]

## 2020-07-22 NOTE — H&P
History and Physical    Patient: Art Simms MRN: [id number]  SSN: [id number]    YOB: 1990  Age: 31 y.o. Sex: female      Subjective:      Art Simms is a 31 y.o. female who presents to [facility] with complaint of Abdominal Pain and Vomiting. Patient reports that she has been unable to tolerate food or drink for the last two days and has also had a constant, 10/10 intensity \"excruciating,\" diffuse abdominal pain and was seen in [facility] on 7/19/2020. Patient was noted to have Mild Colitis and was discharged home with anti-emetics and oral antibiotics. However, Patient's pain did not improve and Patient was not able to tolerate PO diet, and so Patient returned to [facility]. Patient reports that she has been having too numerous to count bilious emeses and is \"trying hard not to scream.\"  Patient complaints of constipation without a BM for \"days and days and days. \"  Patient thinks that Diphenhydramine may be making her symptoms worse when administered. Patient admits to have Cyclic Vomiting Syndrome, but that she does not exhibit abdominal pain when vomiting due to that condition. Patient does report that she takes medicinal marijuana. Patient denies previous episodes of colitis. Patient denies fevers, chills, diarrhea, cough, and sick contacts. In [facility], Patient is noted to have Heart Rate 100 bpm, WBC 20.2, HCG (-), Lactic Acid 1.6, Urinalysis Weakly positive for a UTI. [facility] Physician reports that Patient's CT Abdomen/Pelvis indications that Patient has a mild colitis no different from the CT Abdomen from the day before; however, Patient has now failed Out-Patient therapy. Patient was administered Haloperidol to mange nausea after Ondansetron and Diphenhydramine were tried. Patient is admitted to [facility] (Non-Covid-19 Cohort) for management of Acute Colitis with Nausea and Vomiting.     Past Medical History:   Diagnosis Date    Cyclic vomiting syndrome     [facility]     GERD (gastroesophageal reflux disease)     Hiatal hernia      History reviewed. No pertinent surgical history. Family History   Problem Relation Age of Onset    Osteoporosis Mother     Osteoporosis Maternal Grandmother      Social History     Tobacco Use    Smoking status: Never Smoker    Smokeless tobacco: Never Used   Substance Use Topics    Alcohol use: Not Currently      Prior to Admission medications    Medication Sig Start Date End Date Taking? Authorizing Provider   ciprofloxacin HCl (Cipro) 500 mg tablet Take 1 Tab by mouth two (2) times a day for 7 days. 7/20/20 7/27/20  AMBERLY Abrams   metroNIDAZOLE (FlagyL) 500 mg tablet Take 1 Tab by mouth three (3) times daily for 7 days. 7/20/20 7/27/20  AMBERLY Abrams   senna (Senna) 8.6 mg tablet Take 1 Tab by mouth daily. 7/20/20   AMBERLY Abrams   polyethylene glycol (Miralax) 17 gram/dose powder Take 17 g by mouth daily. 1 tablespoon with 8 oz of water daily 7/20/20   AMBERLY Rao   amitriptyline (ELAVIL) 100 mg tablet TAKE 1 TABLET NIGHTLY 6/5/20   Ladan Bui NP   levETIRAcetam (KEPPRA XR) 750 mg ER tablet Take 1 Tab by mouth four (4) times daily. 10/8/19   Jyotsna Siddiqi MD   levETIRAcetam (KEPPRA) 750 mg tablet Take 1 Tab by mouth four (4) times daily. 9/17/19   Ladan Bui NP   levothyroxine (SYNTHROID) 88 mcg tablet Take 1 Tab by mouth Daily (before breakfast). 9/17/19   Ladan Bui NP   ubidecarenone (COQ-10 PO) Take  by mouth. Provider, Historical   riboflavin, vitamin B2, 400 mg tab Take  by mouth. Provider, Historical   cholecalciferol, vitamin D3, (VITAMIN D3 PO) Take  by mouth. Provider, Historical   OTHER 400 mg. Indications: julio c-mag    Provider, Historical   rizatriptan (MAXALT-MLT) 10 mg disintegrating tablet Take 10 mg by mouth once as needed for Migraine.     Provider, Historical   fluticasone propionate (FLONASE ALLERGY RELIEF) 50 mcg/actuation nasal spray 2 Sprays by Both Nostrils route daily as needed for Rhinitis. Provider, Historical   ondansetron (ZOFRAN ODT) 8 mg disintegrating tablet Take 8 mg by mouth every eight (8) hours as needed for Nausea. Provider, Historical   levonorgestrel (MIRENA) 20 mcg/24 hours (5 yrs) 52 mg IUD 1 Device by IntraUTERine route once. Provider, Historical        Allergies   Allergen Reactions    Milk Containing Products Anaphylaxis    Sulfa (Sulfonamide Antibiotics) Anaphylaxis    Ciprofloxacin Nausea and Vomiting    Codeine Nausea and Vomiting    Doxycycline Hives    Gluten Swelling    Penicillins Hives       Review of Systems:  (+) Abdominal Pain  (+) Nausea  (+) Vomiting  (+) Constipation  (-) Fevers  (-) Chills  (-) Cough  (-) Diarrhea  (-) Dysuria  All other systems have been reviewed and are negative      Objective:     Vitals:    07/22/20 0001 07/22/20 0335   BP: 126/74 102/63   Pulse: 100 93   Resp: 19 16   Temp: 99.3 °F (37.4 °C) 97.9 °F (36.6 °C)   SpO2: 100% 100%   Weight: 77.1 kg (170 lb)    Height: 5' 2\" (1.575 m)         Physical Exam:  General:  Young Adult female lying in bed in (+) Minimal distress  HEENT:  Atraumatic, normocephalic; Pupils equally round and reactive to light with accommodation; Extraocular muscles intact; (+) Mildly injected Conjunctiva; Moist Oropharynx without erythema, edema, or exudates  Neck:  No Bruits; No Lymphadenopathy  Chest:  No pectus carinatum;  No pectus excavatum  Cardiovascular:  (+) Tachycardic rate, regular rhythm without rubs, gallops, or murmurs  Respiratory:  Clear to Auscultation Bilaterally without wheezes, rales, or rhonchi; normal effort of breathing  Abdominal:  (+) Deferred, as Patient states that her abdomen is exquisitely painful between bilious emeses  :  Deferred  Extremities:  Pulses 2+ in BUE without edema, clubbing, or cyanosis  Musculoskeletal:  Strength 5/5 and symmetrical in BUE  Integument:  No rash on face, forearms, or legs  Neurological: A&O x4/4; No gross deficits of Visual Acuity, Eye Movement, Jaw Opening, Facial Expression, Hearing, Phonation, or Head Movement; No gross deficits of Tongue Movement or Slurring of Speech  Psychiatric:  Affect is Distress, but otherwise appropriate; Language is present and fluent; (+) Behavior is Strained, but otherwise approrpriate      Assessment:     Hospital Problems  Date Reviewed: 9/16/2019          Codes Class Noted POA    * (Principal) Colitis, acute ICD-10-CM: K52.9  ICD-9-CM: 558.9  7/22/2020 Yes        Nausea & vomiting ICD-10-CM: R11.2  ICD-9-CM: 787.01  7/22/2020 Yes        UTI (urinary tract infection) ICD-10-CM: N39.0  ICD-9-CM: 599.0  4/70/7961 Yes        Cyclic vomiting syndrome ICD-10-CM: R11.15  ICD-9-CM: 536.2  9/16/2019 Yes    Overview Addendum 9/16/2019 12:20 PM by Angel Espino NP     Diagnosed at [address] [place] [address] in [facility] as cerebral migraine and it triggers abdominal migraine  Multiple esophogeal tears due to vomiting  Last EGD 2016             Acquired hypothyroidism ICD-10-CM: E03.9  ICD-9-CM: 244.9  9/16/2019 Yes              Plan:     NPO (except medications), IV Metronidazole, IV Levofloxacin, IV fluids, PRN Anti-Emetics, PRN Opioids. Surgical Consult to follow along. Continue home medications for Seizures, Hypothyroidism, and Depression. DVT mechanoprophylaxis.     Signed By: Corin Rodgers DO     July 22, 2020

## 2020-07-22 NOTE — ED PROVIDER NOTES
Douglas Walker is a 31 y.o. female complains of worsening mid abdominal pain that woke her up tonight. Patient was seen yesterday and diagnosed with colitis and had work-up to include CT. Patient states the pain is sharp and stabbing and worse with attempted p.o. intake. She has not taken anything for the pain prior to arrival.  She has a little bit of diarrhea. There is no hematemesis. Patient has any urinary symptoms include dysuria hematuria. There is no blood in her stool or melena. The history is provided by the patient, medical records and the EMS personnel. Past Medical History:   Diagnosis Date    Cyclic vomiting syndrome     [facility] ([facility]     GERD (gastroesophageal reflux disease)     Hiatal hernia        No past surgical history on file.       Family History:   Problem Relation Age of Onset    Osteoporosis Mother     Osteoporosis Maternal Grandmother        Social History     Socioeconomic History    Marital status:      Spouse name: Not on file    Number of children: Not on file    Years of education: Not on file    Highest education level: Not on file   Occupational History    Not on file   Social Needs    Financial resource strain: Not on file    Food insecurity     Worry: Not on file     Inability: Not on file   [employer] needs     Medical: Not on file     Non-medical: Not on file   Tobacco Use    Smoking status: Never Smoker    Smokeless tobacco: Never Used   Substance and Sexual Activity    Alcohol use: Not Currently    Drug use: Not on file    Sexual activity: Yes   Lifestyle    Physical activity     Days per week: Not on file     Minutes per session: Not on file    Stress: Not on file   Relationships    Social connections     Talks on phone: Not on file     Gets together: Not on file     Attends Restorationist service: Not on file     Active member of club or organization: Not on file     Attends meetings of clubs or organizations: Not on file Relationship status: Not on file    Intimate partner violence     Fear of current or ex partner: Not on file     Emotionally abused: Not on file     Physically abused: Not on file     Forced sexual activity: Not on file   Other Topics Concern    Not on file   Social History Narrative    Not on file         ALLERGIES: Milk containing products; Sulfa (sulfonamide antibiotics); Ciprofloxacin; Codeine; Doxycycline; Gluten; and Penicillins    Review of Systems   Constitutional: Positive for appetite change. Negative for fever. HENT: Negative for sore throat. Eyes: Negative for visual disturbance. Respiratory: Negative for cough and shortness of breath. Cardiovascular: Negative for chest pain. Gastrointestinal: Positive for abdominal pain. Negative for blood in stool. Genitourinary: Positive for decreased urine volume. Negative for difficulty urinating and dysuria. Musculoskeletal: Negative for gait problem. Skin: Negative for rash. Allergic/Immunologic: Negative for immunocompromised state. Neurological: Negative for syncope. Psychiatric/Behavioral: Positive for sleep disturbance. Vitals:    07/22/20 0001   BP: 126/74   Pulse: 100   Resp: 19   Temp: 99.3 °F (37.4 °C)   SpO2: 100%   Weight: 77.1 kg (170 lb)   Height: 5' 2\" (1.575 m)            Physical Exam  Vitals signs and nursing note reviewed. Constitutional:       General: She is in acute distress. Appearance: She is well-developed. She is obese. She is ill-appearing. She is not toxic-appearing or diaphoretic. HENT:      Head: Normocephalic and atraumatic. Right Ear: External ear normal.      Left Ear: External ear normal.      Nose: Nose normal.      Mouth/Throat:      Pharynx: Uvula midline. Eyes:      General: No scleral icterus. Conjunctiva/sclera: Conjunctivae normal.   Neck:      Musculoskeletal: Neck supple. Cardiovascular:      Rate and Rhythm: Normal rate and regular rhythm.       Heart sounds: Normal heart sounds. Pulmonary:      Effort: Pulmonary effort is normal.      Breath sounds: Normal breath sounds. Abdominal:      General: Abdomen is protuberant. Palpations: Abdomen is soft. There is no hepatomegaly or splenomegaly. Tenderness: There is abdominal tenderness in the epigastric area, periumbilical area and left upper quadrant. There is guarding and rebound. There is no right CVA tenderness or left CVA tenderness. Negative signs include Linares's sign and McBurney's sign. Musculoskeletal:      Right lower leg: No edema. Left lower leg: No edema. Skin:     General: Skin is warm and dry. Capillary Refill: Capillary refill takes less than 2 seconds. Neurological:      Mental Status: She is alert and oriented to person, place, and time.       Gait: Gait normal.   Psychiatric:         Behavior: Behavior normal.          MDM       Procedures  Vitals:  Patient Vitals for the past 12 hrs:   Temp Pulse Resp BP SpO2   07/22/20 0001 99.3 °F (37.4 °C) 100 19 126/74 100 %       Medications ordered:   Medications   metroNIDAZOLE (FLAGYL) IVPB premix 500 mg (has no administration in time range)   levoFLOXacin (LEVAQUIN) 750 mg in D5W IVPB (has no administration in time range)   sodium chloride 0.9 % bolus infusion 1,000 mL (has no administration in time range)   pantoprazole (PROTONIX) injection 40 mg (has no administration in time range)   HYDROmorphone (DILAUDID) injection 1 mg (has no administration in time range)   metoclopramide HCl (REGLAN) injection 5 mg (has no administration in time range)   ondansetron (ZOFRAN) injection 4 mg (has no administration in time range)   sodium chloride 0.9 % bolus infusion 1,000 mL (1,000 mL IntraVENous New Bag 7/22/20 0054)   sodium chloride 0.9 % bolus infusion 1,000 mL (1,000 mL IntraVENous New Bag 7/22/20 0053)   ketorolac (TORADOL) injection 30 mg (30 mg IntraVENous Given 7/22/20 0045)   morphine injection 4 mg (4 mg IntraVENous Given 7/22/20 0045) metoclopramide HCl (REGLAN) injection 5 mg (5 mg IntraVENous Given 7/22/20 0045)   diphenhydrAMINE (BENADRYL) injection 12.5 mg (12.5 mg IntraVENous Given 7/22/20 0045)   iopamidoL (ISOVUE 300) 61 % contrast injection 100 mL (100 mL IntraVENous Given 7/22/20 0133)         Lab findings:  Recent Results (from the past 12 hour(s))   CBC WITH AUTOMATED DIFF    Collection Time: 07/22/20 12:20 AM   Result Value Ref Range    WBC 20.2 (H) 4.6 - 13.2 K/uL    RBC 4.17 (L) 4.20 - 5.30 M/uL    HGB 12.4 12.0 - 16.0 g/dL    HCT 38.7 35.0 - 45.0 %    MCV 92.8 74.0 - 97.0 FL    MCH 29.7 24.0 - 34.0 PG    MCHC 32.0 31.0 - 37.0 g/dL    RDW 13.7 11.6 - 14.5 %    PLATELET 053 628 - 848 K/uL    MPV 10.9 9.2 - 11.8 FL    NEUTROPHILS 81 (H) 40 - 73 %    LYMPHOCYTES 13 (L) 21 - 52 %    MONOCYTES 6 3 - 10 %    EOSINOPHILS 0 0 - 5 %    BASOPHILS 0 0 - 2 %    ABS. NEUTROPHILS 16.4 (H) 1.8 - 8.0 K/UL    ABS. LYMPHOCYTES 2.5 0.9 - 3.6 K/UL    ABS. MONOCYTES 1.2 0.05 - 1.2 K/UL    ABS. EOSINOPHILS 0.1 0.0 - 0.4 K/UL    ABS. BASOPHILS 0.1 0.0 - 0.1 K/UL    DF AUTOMATED     METABOLIC PANEL, COMPREHENSIVE    Collection Time: 07/22/20 12:20 AM   Result Value Ref Range    Sodium 140 136 - 145 mmol/L    Potassium 3.5 3.5 - 5.5 mmol/L    Chloride 109 100 - 111 mmol/L    CO2 25 21 - 32 mmol/L    Anion gap 6 3.0 - 18 mmol/L    Glucose 102 (H) 74 - 99 mg/dL    BUN 11 7.0 - 18 MG/DL    Creatinine 0.83 0.6 - 1.3 MG/DL    BUN/Creatinine ratio 13 12 - 20      GFR est AA >60 >60 ml/min/1.73m2    GFR est non-AA >60 >60 ml/min/1.73m2    Calcium 8.6 8.5 - 10.1 MG/DL    Bilirubin, total 0.4 0.2 - 1.0 MG/DL    ALT (SGPT) 45 13 - 56 U/L    AST (SGOT) 33 10 - 38 U/L    Alk.  phosphatase 101 45 - 117 U/L    Protein, total 8.2 6.4 - 8.2 g/dL    Albumin 4.3 3.4 - 5.0 g/dL    Globulin 3.9 2.0 - 4.0 g/dL    A-G Ratio 1.1 0.8 - 1.7     LIPASE    Collection Time: 07/22/20 12:20 AM   Result Value Ref Range    Lipase 87 73 - 393 U/L   HCG QL SERUM    Collection Time: 07/22/20 12:20 AM   Result Value Ref Range    HCG, Ql. Negative NEG         EKG interpretation by ED Physician:      Pulse ox: 100% ra, normal    X-Ray, CT or other radiology findings or impressions:  CT ABD PELV W CONT    (Results Pending)   Equivocal findings of colitis. No clearly infective process identified. Possible stranding around the left ureter. Progress notes, Consult notes or additional Procedure notes:   Given patient's worsening pain and increased leukocytosis will get repeat CT to evaluate for worsening infection or obstruction    Patient with continued pain and slightly worsening leukocytosis. Equivocal for colitis. Patient will still require admission for IV fluids and possible antibiotics    Discussed with Dr. Ace Narvaez on-call for the hospitalist service who will admit    ED Critical Care Note    System at risk for life threatening failure: Neuro, cardiac, pulmonary, renal  Associated problems: Tachycardia, vomiting, leukocytosis    Critical Care services provided: Bedside management of abdominal pain, vomiting, leukocytosis, possible infection, documentation, consultation, bedside reassessment  Excluded procedures (time not included in critical care): Pulse ox interpretation    Total Critical Care Time (in minutes) 48          Reevaluation of patient:   stable    Disposition:  Diagnosis:   1. Acute generalized abdominal pain    2. Non-intractable vomiting with nausea, unspecified vomiting type    3. Acute colitis        Disposition: admit    Follow-up Information    None           Patient's Medications   Start Taking    No medications on file   Continue Taking    AMITRIPTYLINE (ELAVIL) 100 MG TABLET    TAKE 1 TABLET NIGHTLY    CHOLECALCIFEROL, VITAMIN D3, (VITAMIN D3 PO)    Take  by mouth. CIPROFLOXACIN HCL (CIPRO) 500 MG TABLET    Take 1 Tab by mouth two (2) times a day for 7 days.     FLUTICASONE PROPIONATE (FLONASE ALLERGY RELIEF) 50 MCG/ACTUATION NASAL SPRAY    2 Sprays by Both Nostrils route daily as needed for Rhinitis. LEVETIRACETAM (KEPPRA XR) 750 MG ER TABLET    Take 1 Tab by mouth four (4) times daily. LEVETIRACETAM (KEPPRA) 750 MG TABLET    Take 1 Tab by mouth four (4) times daily. LEVONORGESTREL (MIRENA) 20 MCG/24 HOURS (5 YRS) 52 MG IUD    1 Device by IntraUTERine route once. LEVOTHYROXINE (SYNTHROID) 88 MCG TABLET    Take 1 Tab by mouth Daily (before breakfast). METRONIDAZOLE (FLAGYL) 500 MG TABLET    Take 1 Tab by mouth three (3) times daily for 7 days. ONDANSETRON (ZOFRAN ODT) 8 MG DISINTEGRATING TABLET    Take 8 mg by mouth every eight (8) hours as needed for Nausea. OTHER    400 mg. Indications: julio c-mag    POLYETHYLENE GLYCOL (MIRALAX) 17 GRAM/DOSE POWDER    Take 17 g by mouth daily. 1 tablespoon with 8 oz of water daily    RIBOFLAVIN, VITAMIN B2, 400 MG TAB    Take  by mouth. RIZATRIPTAN (MAXALT-MLT) 10 MG DISINTEGRATING TABLET    Take 10 mg by mouth once as needed for Migraine. SENNA (SENNA) 8.6 MG TABLET    Take 1 Tab by mouth daily. UBIDECARENONE (COQ-10 PO)    Take  by mouth.    These Medications have changed    No medications on file   Stop Taking    No medications on file     s

## 2020-07-23 LAB
ALBUMIN SERPL-MCNC: 3.7 G/DL (ref 3.4–5)
ALBUMIN/GLOB SERPL: 1.2 {RATIO} (ref 0.8–1.7)
ALP SERPL-CCNC: 80 U/L (ref 45–117)
ALT SERPL-CCNC: 34 U/L (ref 13–56)
ANION GAP SERPL CALC-SCNC: 6 MMOL/L (ref 3–18)
AST SERPL-CCNC: 21 U/L (ref 10–38)
BASOPHILS # BLD: 0 K/UL (ref 0–0.1)
BASOPHILS NFR BLD: 0 % (ref 0–2)
BILIRUB SERPL-MCNC: 0.5 MG/DL (ref 0.2–1)
BUN SERPL-MCNC: 9 MG/DL (ref 7–18)
BUN/CREAT SERPL: 14 (ref 12–20)
CALCIUM SERPL-MCNC: 8.1 MG/DL (ref 8.5–10.1)
CHLORIDE SERPL-SCNC: 106 MMOL/L (ref 100–111)
CO2 SERPL-SCNC: 27 MMOL/L (ref 21–32)
CREAT SERPL-MCNC: 0.66 MG/DL (ref 0.6–1.3)
DIFFERENTIAL METHOD BLD: ABNORMAL
EOSINOPHIL # BLD: 0 K/UL (ref 0–0.4)
EOSINOPHIL NFR BLD: 0 % (ref 0–5)
ERYTHROCYTE [DISTWIDTH] IN BLOOD BY AUTOMATED COUNT: 13.6 % (ref 11.6–14.5)
GLOBULIN SER CALC-MCNC: 3.2 G/DL (ref 2–4)
GLUCOSE SERPL-MCNC: 104 MG/DL (ref 74–99)
HCT VFR BLD AUTO: 34.7 % (ref 35–45)
HGB BLD-MCNC: 11.2 G/DL (ref 12–16)
INR PPP: 1.1 (ref 0.8–1.2)
LYMPHOCYTES # BLD: 2.7 K/UL (ref 0.9–3.6)
LYMPHOCYTES NFR BLD: 15 % (ref 21–52)
MCH RBC QN AUTO: 29.8 PG (ref 24–34)
MCHC RBC AUTO-ENTMCNC: 32.3 G/DL (ref 31–37)
MCV RBC AUTO: 92.3 FL (ref 74–97)
MONOCYTES # BLD: 1.4 K/UL (ref 0.05–1.2)
MONOCYTES NFR BLD: 8 % (ref 3–10)
NEUTS SEG # BLD: 13.5 K/UL (ref 1.8–8)
NEUTS SEG NFR BLD: 77 % (ref 40–73)
PLATELET # BLD AUTO: 380 K/UL (ref 135–420)
PMV BLD AUTO: 10.9 FL (ref 9.2–11.8)
POTASSIUM SERPL-SCNC: 3.2 MMOL/L (ref 3.5–5.5)
PROT SERPL-MCNC: 6.9 G/DL (ref 6.4–8.2)
PROTHROMBIN TIME: 13.8 SEC (ref 11.5–15.2)
RBC # BLD AUTO: 3.76 M/UL (ref 4.2–5.3)
SODIUM SERPL-SCNC: 139 MMOL/L (ref 136–145)
WBC # BLD AUTO: 17.6 K/UL (ref 4.6–13.2)

## 2020-07-23 PROCEDURE — 74011250637 HC RX REV CODE- 250/637: Performed by: INTERNAL MEDICINE

## 2020-07-23 PROCEDURE — 65270000029 HC RM PRIVATE

## 2020-07-23 PROCEDURE — 74011250636 HC RX REV CODE- 250/636: Performed by: INTERNAL MEDICINE

## 2020-07-23 PROCEDURE — 85610 PROTHROMBIN TIME: CPT

## 2020-07-23 PROCEDURE — 85025 COMPLETE CBC W/AUTO DIFF WBC: CPT

## 2020-07-23 PROCEDURE — 80053 COMPREHEN METABOLIC PANEL: CPT

## 2020-07-23 PROCEDURE — C9113 INJ PANTOPRAZOLE SODIUM, VIA: HCPCS | Performed by: INTERNAL MEDICINE

## 2020-07-23 PROCEDURE — 36415 COLL VENOUS BLD VENIPUNCTURE: CPT

## 2020-07-23 PROCEDURE — 74011636637 HC RX REV CODE- 636/637: Performed by: INTERNAL MEDICINE

## 2020-07-23 PROCEDURE — 74011250637 HC RX REV CODE- 250/637: Performed by: HOSPITALIST

## 2020-07-23 RX ORDER — LORAZEPAM 1 MG/1
1 TABLET ORAL EVERY 6 HOURS
Status: DISCONTINUED | OUTPATIENT
Start: 2020-07-23 | End: 2020-07-25 | Stop reason: HOSPADM

## 2020-07-23 RX ORDER — LORAZEPAM 1 MG/1
1 TABLET ORAL
Status: DISCONTINUED | OUTPATIENT
Start: 2020-07-23 | End: 2020-07-23

## 2020-07-23 RX ORDER — FACIAL-BODY WIPES
10 EACH TOPICAL DAILY
Status: DISCONTINUED | OUTPATIENT
Start: 2020-07-24 | End: 2020-07-25 | Stop reason: HOSPADM

## 2020-07-23 RX ORDER — NALOXONE HYDROCHLORIDE 0.4 MG/ML
0.4 INJECTION, SOLUTION INTRAMUSCULAR; INTRAVENOUS; SUBCUTANEOUS AS NEEDED
Status: DISCONTINUED | OUTPATIENT
Start: 2020-07-23 | End: 2020-07-25 | Stop reason: HOSPADM

## 2020-07-23 RX ORDER — SUMATRIPTAN 6 MG/.5ML
6 INJECTION, SOLUTION SUBCUTANEOUS ONCE
Status: COMPLETED | OUTPATIENT
Start: 2020-07-23 | End: 2020-07-23

## 2020-07-23 RX ADMIN — ONDANSETRON 4 MG: 2 INJECTION INTRAMUSCULAR; INTRAVENOUS at 13:40

## 2020-07-23 RX ADMIN — LEVETIRACETAM 1500 MG: 500 TABLET ORAL at 17:20

## 2020-07-23 RX ADMIN — ONDANSETRON 4 MG: 2 INJECTION INTRAMUSCULAR; INTRAVENOUS at 01:28

## 2020-07-23 RX ADMIN — AMITRIPTYLINE HYDROCHLORIDE 100 MG: 50 TABLET, FILM COATED ORAL at 23:10

## 2020-07-23 RX ADMIN — SUMATRIPTAN 6 MG: 6 INJECTION SUBCUTANEOUS at 19:00

## 2020-07-23 RX ADMIN — LEVETIRACETAM 1500 MG: 500 TABLET ORAL at 09:34

## 2020-07-23 RX ADMIN — LORAZEPAM 1 MG: 1 TABLET ORAL at 11:51

## 2020-07-23 RX ADMIN — MELATONIN 12 MG: at 23:10

## 2020-07-23 RX ADMIN — MORPHINE SULFATE 2 MG: 2 INJECTION, SOLUTION INTRAMUSCULAR; INTRAVENOUS at 17:20

## 2020-07-23 RX ADMIN — METRONIDAZOLE 500 MG: 500 INJECTION, SOLUTION INTRAVENOUS at 00:43

## 2020-07-23 RX ADMIN — LORAZEPAM 1 MG: 1 TABLET ORAL at 18:58

## 2020-07-23 RX ADMIN — OXYCODONE HYDROCHLORIDE AND ACETAMINOPHEN 1 TABLET: 5; 325 TABLET ORAL at 01:58

## 2020-07-23 RX ADMIN — LEVOFLOXACIN 750 MG: 5 INJECTION, SOLUTION INTRAVENOUS at 00:43

## 2020-07-23 RX ADMIN — LORAZEPAM 1 MG: 1 TABLET ORAL at 23:10

## 2020-07-23 RX ADMIN — MORPHINE SULFATE 2 MG: 2 INJECTION, SOLUTION INTRAMUSCULAR; INTRAVENOUS at 09:30

## 2020-07-23 RX ADMIN — LEVOTHYROXINE SODIUM 100 MCG: 100 TABLET ORAL at 06:38

## 2020-07-23 RX ADMIN — OXYCODONE HYDROCHLORIDE AND ACETAMINOPHEN 1 TABLET: 5; 325 TABLET ORAL at 13:40

## 2020-07-23 RX ADMIN — PROCHLORPERAZINE EDISYLATE 10 MG: 5 INJECTION INTRAMUSCULAR; INTRAVENOUS at 09:24

## 2020-07-23 RX ADMIN — METRONIDAZOLE 500 MG: 500 INJECTION, SOLUTION INTRAVENOUS at 09:34

## 2020-07-23 RX ADMIN — SODIUM CHLORIDE 125 ML/HR: 900 INJECTION, SOLUTION INTRAVENOUS at 19:04

## 2020-07-23 RX ADMIN — ONDANSETRON 4 MG: 2 INJECTION INTRAMUSCULAR; INTRAVENOUS at 17:20

## 2020-07-23 RX ADMIN — MORPHINE SULFATE 2 MG: 2 INJECTION, SOLUTION INTRAMUSCULAR; INTRAVENOUS at 03:08

## 2020-07-23 RX ADMIN — PANTOPRAZOLE SODIUM 40 MG: 40 INJECTION, POWDER, FOR SOLUTION INTRAVENOUS at 06:24

## 2020-07-23 RX ADMIN — PROCHLORPERAZINE EDISYLATE 10 MG: 5 INJECTION INTRAMUSCULAR; INTRAVENOUS at 20:02

## 2020-07-23 NOTE — PROGRESS NOTES
conducted an initial consultation and Spiritual Assessment for [facility] [place], who is a 30 y.o.,female. Patients Primary Language is: [place]. According to the patients EMR Quaker Affiliation is: No Cheondoism. The reason the Patient came to the hospital is:   Patient Active Problem List    Diagnosis Date Noted    UTI (urinary tract infection) 07/22/2020    Nausea & vomiting 07/22/2020    Colitis, acute 07/22/2020    [facility] 21/27/4849    Cyclic vomiting syndrome 09/16/2019    GERD (gastroesophageal reflux disease) 09/16/2019    Hiatal hernia 09/16/2019    Acquired hypothyroidism 09/16/2019        The  provided the following Interventions:  Initiated a relationship of care and support. Explored issues of swetha, spirituality and/or Evangelical needs while hospitalized. Listened empathically. Provided chaplaincy education. Provided information about Spiritual Care Services. Offered prayer and assurance of continued prayers on patient's behalf. Chart reviewed. The following outcomes were achieved:  Patient shared some information about their medical narrative and spiritual journey/beliefs. Patient processed feeling about current hospitalization. Patient expressed gratitude for the 's visit. Assessment:  Patient did not indicate any spiritual or Evangelical issues which require Spiritual Care Services interventions at this time. Patient does not have any Evangelical/cultural needs that will affect patients preferences in health care. Plan:  Chaplains will continue to follow and will provide pastoral care on an as needed or requested basis.  recommends bedside caregivers page  on duty if patient shows signs of acute spiritual or emotional distress.     [facility]   Staff [address] [facility]   [phone]

## 2020-07-23 NOTE — PROGRESS NOTES
TRANSFER - IN REPORT:    Verbal report received from [employer] (name) on Carissa  being received from ED (unit) for routine progression of care      Report consisted of patients Situation, Background, Assessment and   Recommendations(SBAR). Information from the following report(s) SBAR, Kardex and MAR was reviewed with the receiving nurse. Opportunity for questions and clarification was provided. Assessment completed upon patients arrival to unit and care assumed. 2040  Patient arrived via bed but was able to walk with steady gait to the unit bed. IV on left arm verified and flushed, I hang a new bag of fluids as ordered. She is alert and oriented x 4. Was complaining of nausea and reported that on her way to this unit she threw up the oral pain medication given to her at the ED. She was laying on a kneeling/ slouched position on the bed says that it helps her with the nausea. She was asking about pain medication as well. Advised her that I I still have to wait about an hour from last pain medication before I can give her another medication for pain. Patient stated she prefers the IV one. I advised her that since she has both oral and IV prn pain medication that the plan for the night is to alternate the two. Patient stated understanding. Bed low, call light within reach. Patient assisted to walk to the bathroom. Gait is stable. 2130  Patient called to report that she is in pain. 2230  Patient vomiting, she was in a kneeling position on the bed when I came to check her in her room. 0000  Patient complained of pain in abdominal area. 0100  Patient is on and off the call light. I explained that besides the pain medications and nausea meds I don't know what I can do for her anymore. Patient was pleasant about it. 0200  Patient called for pain. Mike 6027 patient that most likely the narcotics are not helping her and instead are actually making her have time outside. 0430  Assisted patient with wiping herself with chlorhexidine in preparation for possing     1930  Bedside, Verbal, and Written shift change report given to [address] [place] [address] (oncoming nurse) by Franci Denise nurse). Report included the following information SBAR, Kardex, and MAR.     2000  Patient is in bed, alert and oriented. Oxygen on/ Room air, IV CDI, dressing CDI, no sign of distress. Bed low, call bell within reach. 2100  Patient is in bed, resting/ sleeping. No sign of distress. Bed low, call bell within reach. 2200  Patient is in bed, resting/ sleeping. No sign of distress. Bed low, call bell within reach. 2300  Patient is in bed, resting/ sleeping. No sign of distress. Bed low, call bell within reach. 0000  Patient is in bed, alert and oriented. Oxygen on/ Room air, IV CDI, dressing CDI, no sign of distress. Bed low, call bell within reach. 0730  Bedside, Verbal, and Written shift change report given to RN (oncoming nurse) by [address] [place] [address] (offgoing nurse). Report included the following information SBAR, Kardex, and MAR.

## 2020-07-23 NOTE — CONSULTS
Gastroenterology Consult    Patient: Mai Loaiza MRN: [id number]  SSN: [id number]    YOB: 1990  Age: 31 y.o. Sex: female        Assessment:   1. Acute exacerbation of chronic cyclical vomiting syndrome with migraine probably precipitated by recurrent UTI, constipation and dehydration. Hx of extensive evaluations by GI specialists in [place] and finally confirmed by an expert neurogastroenterologist in [address] about 5 years ago. Her daily prophylactive regimen includes Amitriptylline, CO-Q10,  L-carnithine and vitamin B2.  2.  Hx of recurrent UTI with CT suggesting stranding near the left ureter. Must consider  tract anatomical anomalies predisposing her to recurrent UTI versus post-coital event. 3.  Abnormal CT suggesting colonic wall thickening that most likely represents underinflation rather that an inflammatory condition. She does not have any symptoms of colitis such as diarrhea and rectal bleeding. 4.  Hx of abdominal migraines r/o functional dyspepsia. Hx of multiple EGDs and Colonoscopies. Last EGD was in [place] in [place]. 5.  Chronic constipation. CTAP x 2 this admission without fecal impaction. 6.  GERD with hiatal hernia. 7.  Chronic cannabis use. Plan:   1.  I had a long discussion with the patient who if extremely well-versed on the diagnosis and management of CVS.  The main goal at this point is to abort her acute exacerbation and bring her back to her baseline so she can manage her condition at home. Unfortunately I could not provide a cure for her situation and she will need to be followed regularly by her primary GI specialist/neurogastroenterologist as an outpatient. I recommend a combination of single dose Sumatriptan 6 mg SC x 1 dose with Ativan 1-2 mg IV every 8 hours to hopefully break her vomiting cycles. I will not alter her daily prophylactic regimen.   2.  Recommend she see a Urologist as an outpatient to evaluate for any  tract anomaly predisposing her to recurrent UTI. I encourage her to have her  present during her Urology evaluation. In the meantime,  I would recommend post-coital UTI prophylaxis and will defer to Dr. Magno Ozuna and his team.  3.   For now continue efforts to provide adequate IV hydration and nutrition support. Will order Dulcolax suppository for her constipation. 4.   Discussed with patient the problem of chronic cannabis use and how this will complicate her clinical picture and actually contribute to her nausea and vomiting. She should explore interventions to wean her off cannabis use and that includes seeking professional help. Chief Complaint:    Intractable vomiting. Hx of CVS.    Subjective:      Lazarus Bianchi is a 31 y.o. female with a hx of abdominal migraine syndrome and cyclical vomiting syndrome was admitted yesterday for severe intractable nausea, vomiting, dry heaves and diffuse abdominal pain and tenderness. She was seen at the ED on 7/19 and was released only to return yesterday without improvement. She was given Ondansteron,  Diphenhydramine and Haloperidol without improvement. Her wbc was 20,200 with normal lactic acid and contrast CTAP suggesting possible colitis versus underinsufflation at the transverse colon. She was given Levaquin and Flagyl and surgery consulted. She was evaluated by Dr. Rachel Gomes who did not recommend surgical intervention. At the moment her pain has almost resolved but still unable to tolerate oral liquids or food. Dr. Magno Ozuna requested assistance to help out. According to the patient these episodes are typical of her abdominal migraine and CVS exacerbations. She states her problems with abdominal pain and vomiting date back to her teenage years. She reports seeing multiple physicians and GI specialists mainly in the [place] region and been through numerous EGDs, colonoscopies and imaging studies including a gastric empyting scan.   Around 5 years ago she saw Dr. Iesha Gale at the [facility] in [facility] and underwent extensive testing and was given the diagnosis of cyclical vomiting syndrome with migraines. She was prescribed a regimen of Amitriptylline, CO-Q10, L-carnithine and vitamin B2 for daily prophylaxis. She has noted that her exacerbations are frequently provoked by UTIs, constipation, dehydration or stress. Her last hospital visit was at [facility] 2 months ago. She moved to [facility] last year as her  is with the [employer] and is currently under the care of her [facility] PCP but not seeing any local gastroenterologist.    Hospital Problems  Date Reviewed: 9/16/2019          Codes Class Noted POA    UTI (urinary tract infection) ICD-10-CM: N39.0  ICD-9-CM: 599.0  7/22/2020 Yes        Nausea & vomiting ICD-10-CM: R11.2  ICD-9-CM: 787.01  7/22/2020 Yes        * (Principal) Cyclic vomiting syndrome ICD-10-CM: R11.15  ICD-9-CM: 536.2  9/16/2019 Yes    Overview Addendum 9/16/2019 12:20 PM by Masoud Nichols NP     Diagnosed at [address] [place] [address] in [facility] as cerebral migraine and it triggers abdominal migraine  Multiple esophogeal tears due to vomiting  Last EGD 2016             Acquired hypothyroidism ICD-10-CM: E03.9  ICD-9-CM: 244.9  9/16/2019 Yes            Past Medical History:   Diagnosis Date    Cyclic vomiting syndrome     Epilepsy ([facility] [address]     GERD (gastroesophageal reflux disease)     Hiatal hernia      History reviewed. No pertinent surgical history.    Family History   Problem Relation Age of Onset    Osteoporosis Mother     Osteoporosis Maternal Grandmother      Social History     Tobacco Use    Smoking status: Never Smoker    Smokeless tobacco: Never Used   Substance Use Topics    Alcohol use: Not Currently      Current Facility-Administered Medications   Medication Dose Route Frequency Provider Last Rate Last Dose    LORazepam (ATIVAN) tablet 1 mg  1 mg Oral Q8H PRN Martha Sinks H, DO   1 mg at 07/23/20 1151    naloxone (NARCAN) injection 0.4 mg  0.4 mg IntraVENous PRN Donnice Loft, DO        levoFLOXacin (LEVAQUIN) 750 mg in D5W IVPB  750 mg IntraVENous Q24H Viva Alvin,  mL/hr at 07/23/20 0043 750 mg at 07/23/20 0043    amitriptyline (ELAVIL) tablet 100 mg  100 mg Oral QHS Jennifer Rodriguez [place], DO   100 mg at 07/22/20 2227    0.9% sodium chloride infusion  125 mL/hr IntraVENous CONTINUOUS Viva Alvin,  mL/hr at 07/22/20 2111 125 mL/hr at 07/22/20 2111    ondansetron (ZOFRAN) injection 4 mg  4 mg IntraVENous Q4H PRN Yasmin Nares E, DO   4 mg at 07/23/20 1340    docusate sodium (COLACE) capsule 100 mg  100 mg Oral BID PRN Viva Alvin, DO        acetaminophen (TYLENOL) tablet 650 mg  650 mg Oral Q6H PRN Viva Alvin, DO        pantoprazole (PROTONIX) injection 40 mg  40 mg IntraVENous DAILY PRN Yasmin Nares E, DO   40 mg at 07/23/20 0624    albuterol-ipratropium (DUO-NEB) 2.5 MG-0.5 MG/3 ML  3 mL Nebulization Q6H PRN Viva Alvin, DO        melatonin tablet 12 mg  12 mg Oral QHS PRN Yasmin Nares E, DO   12 mg at 07/22/20 2228    oxyCODONE-acetaminophen (PERCOCET) 5-325 mg per tablet 1 Tab  1 Tab Oral Q4H PRN Yasmin Nares E, DO   1 Tab at 07/23/20 1340    morphine injection 2 mg  2 mg IntraVENous Q3H PRN Yasmin Nares E, DO   2 mg at 07/23/20 0930    prochlorperazine (COMPAZINE) injection 10 mg  10 mg IntraVENous Q6H PRN Yasmin Nares E, DO   10 mg at 07/23/20 9598    levETIRAcetam (KEPPRA) tablet 1,500 mg  1,500 mg Oral BID Donnice Loft, DO   1,500 mg at 07/23/20 0934    [START ON 7/25/2020] levothyroxine (SYNTHROID) tablet 88 mcg  88 mcg Oral Once per day on Sun Sat Fredis Darby,         levothyroxine (SYNTHROID) tablet 100 mcg  100 mcg Oral Once per day on Mon Tue Wed Thu Fri Fredis Darby,    100 mcg at 07/23/20 8379        Allergies   Allergen Reactions    Milk Containing Products Anaphylaxis    Sulfa (Sulfonamide Antibiotics) Anaphylaxis    Ciprofloxacin Nausea and Vomiting    Codeine Nausea and Vomiting    Doxycycline Hives    Gluten Swelling    Penicillins Hives       Review of Systems:  A comprehensive review of systems was negative except for that written in the History of Present Illness. Objective:     Patient Vitals for the past 24 hrs:   Temp Pulse Resp BP SpO2   07/23/20 1235 98.6 °F (37 °C) 69 18 112/78 97 %   07/23/20 0845 98.8 °F (37.1 °C) 82 18 119/84 98 %   07/23/20 0032 98.8 °F (37.1 °C) 95 18 115/87 100 %   07/22/20 2050 98.6 °F (37 °C) 86 18 128/80 94 %   07/22/20 2013  79 18 132/68 100 %   07/22/20 1815 98.6 °F (37 °C) 71 16 110/69 100 %       No intake or output data in the 24 hours ending 07/23/20 1639    Physical Exam:  Well developed, well nourished. Awake, alert,oriented, coherent. Anicteric sclerae. Moist oral mucosa. No thrush. Supple neck. No JVD or adenopathy. No goiter. Clear breath sounds. RRR, no murmurs. Abdomen nondistended, normal BS, nontender, no rebound or guarding, no hepatosplenomegaly, no bruits, no ascites, no hernias. Rectal exam not performed. Warm extremities, 2+ pulses, no edema, no tremors or asterexis. No lateralizing neurologic signs. Laboratory Results:    Labs: Results:   Chemistry Recent Labs     07/23/20  0305 07/22/20  0020 07/20/20  1707   * 102* 121*    140 139   K 3.2* 3.5 3.5    109 108   CO2 27 25 25   BUN 9 11 13   CREA 0.66 0.83 0.96   CA 8.1* 8.6 9.1   AGAP 6 6 6   BUCR 14 13 14   AP 80 101 112   TP 6.9 8.2 9.0*   ALB 3.7 4.3 4.2   GLOB 3.2 3.9 4.8*   AGRAT 1.2 1.1 0.9    Estimated Creatinine Clearance: 115 mL/min (by C-G formula based on SCr of 0.66 mg/dL).    CBC w/Diff Recent Labs     07/23/20  0305 07/22/20  0020 07/20/20  1707   WBC 17.6* 20.2* 19.7*   RBC 3.76* 4.17* 4.45   HGB 11.2* 12.4 13.1   HCT 34.7* 38.7 40.6    409 473*   GRANS 77* 81* 83*   LYMPH 15* 13* 12*   EOS 0 0 0      Cardiac Enzymes No results for input(s): CPK, CKND1, VICTORINA in the last 72 hours. No lab exists for component: CKRMB, TROIP   Coagulation Recent Labs     07/23/20  0305   PTP 13.8   INR 1.1       Hepatitis Panel No results found for: HAMAT, HAAB, HABT, HAAT, HBSAG, HBSB, HBSAT, HBABN, HBCM, HBCAB, HBCAT, XBCABS, HBEAB, HBEAG, XHEPCS, [id number], HBEGLT, HBCMLT, HBCLT, HBEBLT, [id number], [id number], HAVMLT, [id number], HBCMLT, [id number], HCGAT   Amylase Lipase    Liver Enzymes Recent Labs     07/23/20  0305   TP 6.9   ALB 3.7   AP 80   ALT 34      Thyroid Studies No results for input(s): T4, T3U, TSH, TSHEXT in the last 72 hours. No lab exists for component: T3RU     Pathology pathology         Signed By: Jorge Luis Alicia.  Eileen Browning MD, FACP    July 23, 2020

## 2020-07-23 NOTE — PROGRESS NOTES
Patient received in bed awake. Patient A&O x4. No distress noted. Frequently use items within reach. Bed locked in low position. Call bell within reach and Patient verbalized understanding of use for assistance and needs. 0936-  Patient c/o nausea; see MAR for prn Compazine 10 mg IV to be given. Call bell w/in reach. 0930- Patient given Morphine 2 mg IV for pain 7/10. See MAR and pain assessments. 1000- Patient resting. No further c/o having nausea. Call bell w/in reach. [facility]- Dr. Peggy Casey to [facility] station gave this nurse V.O. for Ativan 1 mg PO every 8 hours prn anxiety, first dose now (RBV). 1150- Patient c/o having anxiety. See MAR for prn Ativan to be given. Call bell w/in reach. 1220- Patient awake. No further c/o anxiety. Call bell w/in reach. 1340- Patient given Percocet 5- 325 mg 1 tab PO for abd pain 7/10; see MAR and pain assessments. Also c/o having nausea see MAR for prn Zofran to be given. 1410- Patient resting. No further c/o having nausea. Call bell w/in reach. 12- Dr. Peggy Casey to floor made aware of Patient having N&V and Zofran has been given; voiced understanding. 1720- Patient given Morphine 2 mg IV for abd pain 7/10; see MAR and pain assessments. Also c/o having nausea see MAR for prn Zofran to be given. Call bell w/in reach. 1750- Patient resting. No further c/o having nausea. Call bell w/in reach.

## 2020-07-23 NOTE — PROGRESS NOTES
General Surgery Consult    Ralph Damon  Admit date: 2020    MRN: [id number]     : 1990     Age: 31 y.o. Attending Physician: Tan Mills MD, [facility]      History of Present Illness:      Ralph Damon is a 31 y.o. female who I am following for evaluation of a chronic abdominal pain with the nausea and vomiting. The patient has stated that she has been vomiting all night and when I spoke with the nurse she said that she has been trying to vomit all night but only very small amount coming out. Surprisingly the patient said that she feels slightly better especially after she was giving the omeprazole. She still complaining of diffuse abdominal pain with nausea. Her vital signs are still normal with no fever or tachycardia. She still have leukocytosis but it has improved slightly. Patient Active Problem List    Diagnosis Date Noted    UTI (urinary tract infection) 2020    Nausea & vomiting 2020    Colitis, acute 2020    Epilepsy ([facility] [address]     Cyclic vomiting syndrome 2019    GERD (gastroesophageal reflux disease) 2019    Hiatal hernia 2019    Acquired hypothyroidism 2019     Past Medical History:   Diagnosis Date    Cyclic vomiting syndrome     Epilepsy ([address]     GERD (gastroesophageal reflux disease)     Hiatal hernia       History reviewed. No pertinent surgical history.    Social History     Tobacco Use    Smoking status: Never Smoker    Smokeless tobacco: Never Used   Substance Use Topics    Alcohol use: Not Currently      Social History     Tobacco Use   Smoking Status Never Smoker   Smokeless Tobacco Never Used     Family History   Problem Relation Age of Onset    Osteoporosis Mother     Osteoporosis Maternal Grandmother       Current Facility-Administered Medications   Medication Dose Route Frequency    metroNIDAZOLE (FLAGYL) IVPB premix 500 mg  500 mg IntraVENous Q8H    levoFLOXacin (LEVAQUIN) 750 mg in D5W IVPB 750 mg IntraVENous Q24H    amitriptyline (ELAVIL) tablet 100 mg  100 mg Oral QHS    0.9% sodium chloride infusion  125 mL/hr IntraVENous CONTINUOUS    ondansetron (ZOFRAN) injection 4 mg  4 mg IntraVENous Q4H PRN    docusate sodium (COLACE) capsule 100 mg  100 mg Oral BID PRN    acetaminophen (TYLENOL) tablet 650 mg  650 mg Oral Q6H PRN    pantoprazole (PROTONIX) injection 40 mg  40 mg IntraVENous DAILY PRN    albuterol-ipratropium (DUO-NEB) 2.5 MG-0.5 MG/3 ML  3 mL Nebulization Q6H PRN    melatonin tablet 12 mg  12 mg Oral QHS PRN    oxyCODONE-acetaminophen (PERCOCET) 5-325 mg per tablet 1 Tab  1 Tab Oral Q4H PRN    morphine injection 2 mg  2 mg IntraVENous Q3H PRN    prochlorperazine (COMPAZINE) injection 10 mg  10 mg IntraVENous Q6H PRN    levETIRAcetam (KEPPRA) tablet 1,500 mg  1,500 mg Oral BID    [START ON 7/25/2020] levothyroxine (SYNTHROID) tablet 88 mcg  88 mcg Oral Once per day on Sun Sat    levothyroxine (SYNTHROID) tablet 100 mcg  100 mcg Oral Once per day on Mon Tue Wed Thu Fri      Allergies   Allergen Reactions    Milk Containing Products Anaphylaxis    Sulfa (Sulfonamide Antibiotics) Anaphylaxis    Ciprofloxacin Nausea and Vomiting    Codeine Nausea and Vomiting    Doxycycline Hives    Gluten Swelling    Penicillins Hives        Review of Systems:  Pertinent items are noted in the History of Present Illness. Objective:     Visit Vitals  /87 (BP 1 Location: Left arm)   Pulse 95   Temp 98.8 °F (37.1 °C)   Resp 18   Ht 5' 2\" (1.575 m)   Wt 77.1 kg (170 lb)   LMP 07/20/2020   SpO2 100%   BMI 31.09 kg/m²       Physical Exam:      General:  in no apparent distress, alert, oriented times 3 and cooperative                   Abdomen:   rounded, soft, nontender, nondistended, no masses or organomegaly.                Imaging and Lab Review:     CBC:   Lab Results   Component Value Date/Time    WBC 17.6 (H) 07/23/2020 03:05 AM    RBC 3.76 (L) 07/23/2020 03:05 AM    HGB 11.2 (L) 07/23/2020 03:05 AM    HCT 34.7 (L) 07/23/2020 03:05 AM    PLATELET 596 82/95/2109 03:05 AM     BMP:   Lab Results   Component Value Date/Time    Glucose 104 (H) 07/23/2020 03:05 AM    Sodium 139 07/23/2020 03:05 AM    Potassium 3.2 (L) 07/23/2020 03:05 AM    Chloride 106 07/23/2020 03:05 AM    CO2 27 07/23/2020 03:05 AM    BUN 9 07/23/2020 03:05 AM    Creatinine 0.66 07/23/2020 03:05 AM    Calcium 8.1 (L) 07/23/2020 03:05 AM     CMP:  Lab Results   Component Value Date/Time    Glucose 104 (H) 07/23/2020 03:05 AM    Sodium 139 07/23/2020 03:05 AM    Potassium 3.2 (L) 07/23/2020 03:05 AM    Chloride 106 07/23/2020 03:05 AM    CO2 27 07/23/2020 03:05 AM    BUN 9 07/23/2020 03:05 AM    Creatinine 0.66 07/23/2020 03:05 AM    Calcium 8.1 (L) 07/23/2020 03:05 AM    Anion gap 6 07/23/2020 03:05 AM    BUN/Creatinine ratio 14 07/23/2020 03:05 AM    Alk. phosphatase 80 07/23/2020 03:05 AM    Protein, total 6.9 07/23/2020 03:05 AM    Albumin 3.7 07/23/2020 03:05 AM    Globulin 3.2 07/23/2020 03:05 AM    A-G Ratio 1.2 07/23/2020 03:05 AM       Recent Results (from the past 24 hour(s))   METABOLIC PANEL, COMPREHENSIVE    Collection Time: 07/23/20  3:05 AM   Result Value Ref Range    Sodium 139 136 - 145 mmol/L    Potassium 3.2 (L) 3.5 - 5.5 mmol/L    Chloride 106 100 - 111 mmol/L    CO2 27 21 - 32 mmol/L    Anion gap 6 3.0 - 18 mmol/L    Glucose 104 (H) 74 - 99 mg/dL    BUN 9 7.0 - 18 MG/DL    Creatinine 0.66 0.6 - 1.3 MG/DL    BUN/Creatinine ratio 14 12 - 20      GFR est AA >60 >60 ml/min/1.73m2    GFR est non-AA >60 >60 ml/min/1.73m2    Calcium 8.1 (L) 8.5 - 10.1 MG/DL    Bilirubin, total 0.5 0.2 - 1.0 MG/DL    ALT (SGPT) 34 13 - 56 U/L    AST (SGOT) 21 10 - 38 U/L    Alk.  phosphatase 80 45 - 117 U/L    Protein, total 6.9 6.4 - 8.2 g/dL    Albumin 3.7 3.4 - 5.0 g/dL    Globulin 3.2 2.0 - 4.0 g/dL    A-G Ratio 1.2 0.8 - 1.7     CBC WITH AUTOMATED DIFF    Collection Time: 07/23/20  3:05 AM   Result Value Ref Range    WBC 17.6 (H) 4.6 - 13.2 K/uL    RBC 3.76 (L) 4.20 - 5.30 M/uL    HGB 11.2 (L) 12.0 - 16.0 g/dL    HCT 34.7 (L) 35.0 - 45.0 %    MCV 92.3 74.0 - 97.0 FL    MCH 29.8 24.0 - 34.0 PG    MCHC 32.3 31.0 - 37.0 g/dL    RDW 13.6 11.6 - 14.5 %    PLATELET 623 019 - 863 K/uL    MPV 10.9 9.2 - 11.8 FL    NEUTROPHILS 77 (H) 40 - 73 %    LYMPHOCYTES 15 (L) 21 - 52 %    MONOCYTES 8 3 - 10 %    EOSINOPHILS 0 0 - 5 %    BASOPHILS 0 0 - 2 %    ABS. NEUTROPHILS 13.5 (H) 1.8 - 8.0 K/UL    ABS. LYMPHOCYTES 2.7 0.9 - 3.6 K/UL    ABS. MONOCYTES 1.4 (H) 0.05 - 1.2 K/UL    ABS. EOSINOPHILS 0.0 0.0 - 0.4 K/UL    ABS. BASOPHILS 0.0 0.0 - 0.1 K/UL    DF AUTOMATED     PROTHROMBIN TIME + INR    Collection Time: 07/23/20  3:05 AM   Result Value Ref Range    Prothrombin time 13.8 11.5 - 15.2 sec    INR 1.1 0.8 - 1.2         images and reports reviewed    Assessment:   Ramya Saba is a 31 y.o. female who has a history of cyclic vomiting syndrome and she is presenting with a long history of abdominal pain with nausea and sometimes vomiting. This episode has been lasting for 2 weeks and she was supposed to follow-up with her PCP but because they could not accommodate her she was sent to urgent care but then she went to the emergency room. Based on 2 CT scans that were done in 2 consecutive days there is no evidence of any colitis currently. Plus there is no diarrhea if anything the patient has constipation. There is no indication for any surgical intervention currently and I think that having a GI consultation as an inpatient or outpatient is needed for her especially that the patient does not have a gastroenterologist and she need to establish one giving her long history of nausea abdominal pain and constipation. Plan:     I appreciate the medicine admission and management as well as the consultation  Based on the patient's history, physical exam, and CT scan, there is no need for any surgical intervention.    Close follow-up for leukocytosis  GI consultation for evaluation and management of her cyclic vomiting syndrome  IV fluids    Please call me if you have any questions (cell phone: [phone])     Signed By: Jeffrey Garcia MD     July 23, 2020

## 2020-07-23 NOTE — ED NOTES
TRANSFER - ED to INPATIENT REPORT:    Verbal report given to Navya Scruggs (name) on [facility]  being transferred to [facility](unit) for routine progression of care       Report consisted of patients Situation, Background, Assessment and   Recommendations(SBAR). SBAR report made available to receiving floor on this patient being transferred to [facility]  for routine progression of care       Admitting diagnosis Colitis, acute [K52.9]  Nausea & vomiting [R11.2]  UTI (urinary tract infection) [N39.0]    Information from the following report(s) SBAR, Kardex, ED Summary, Intake/Output and Recent Results was made available to receiving floor. Lines:   Peripheral IV 07/22/20 Right Antecubital (Active)      Medication list updated today    Opportunity for questions and clarification was provided. Patient is oriented to time, place, person and situation.    Patient is  continent and ambulatory with assist     Valuables transported with patient

## 2020-07-23 NOTE — PROGRESS NOTES
Internal Medicine Progress Note    Patient's Name: Olivier Yin  Admit Date: 7/22/2020  Length of Stay: 1      Assessment/Plan     Active Hospital Problems    Diagnosis Date Noted    UTI (urinary tract infection) 07/22/2020    Nausea & vomiting 96/69/9749    Cyclic vomiting syndrome 09/16/2019     Diagnosed at [facility] in [place] as cerebral migraine and it triggers abdominal migraine  Multiple esophogeal tears due to vomiting  Last EGD 2016      Acquired hypothyroidism 09/16/2019     - Cont IVFs  - Attempted fluid challenge today but she failed  - Appreciate surg input, no intervention at this time  - Will consult GI given inability to advance diet  - Ativan PRN  - Cont IVAB, will stop flagyl as doubt colitis  - F/u urine cult  - Cont acceptable home medications for chronic conditions   - DVT protocol    I have personally reviewed all pertinent labs and films that have officially resulted over the last 24 hours. I have personally checked for all pending labs that are awaiting final results.     Subjective     Pt s/e @ bedside  No major events overnight  Feeling better this AM, been hours since vomited  Denies CP or SOB    Objective     Visit Vitals  /78 (BP 1 Location: Right arm, BP Patient Position: At rest)   Pulse 69   Temp 98.6 °F (37 °C)   Resp 18   Ht 5' 2\" (1.575 m)   Wt 77.1 kg (170 lb)   SpO2 97%   BMI 31.09 kg/m²       Physical Exam:  General Appearance: NAD, conversant  Lungs: CTA with normal respiratory effort  CV: RRR, no m/r/g  Abdomen: soft, non-tender, normal bowel sounds  Extremities: no cyanosis, no peripheral edema  Neuro: No focal deficits, motor/sensory intact    Lab/Data Reviewed:  BMP:   Lab Results   Component Value Date/Time     07/23/2020 03:05 AM    K 3.2 (L) 07/23/2020 03:05 AM     07/23/2020 03:05 AM    CO2 27 07/23/2020 03:05 AM    AGAP 6 07/23/2020 03:05 AM     (H) 07/23/2020 03:05 AM    BUN 9 07/23/2020 03:05 AM    CREA 0.66 07/23/2020 03:05 AM    GFRAA >60 07/23/2020 03:05 AM    GFRNA >60 07/23/2020 03:05 AM     CBC:   Lab Results   Component Value Date/Time    WBC 17.6 (H) 07/23/2020 03:05 AM    HGB 11.2 (L) 07/23/2020 03:05 AM    HCT 34.7 (L) 07/23/2020 03:05 AM     07/23/2020 03:05 AM       Imaging Reviewed:  No results found.     Medications Reviewed:  Current Facility-Administered Medications   Medication Dose Route Frequency    LORazepam (ATIVAN) tablet 1 mg  1 mg Oral Q8H PRN    naloxone (NARCAN) injection 0.4 mg  0.4 mg IntraVENous PRN    metroNIDAZOLE (FLAGYL) IVPB premix 500 mg  500 mg IntraVENous Q8H    levoFLOXacin (LEVAQUIN) 750 mg in D5W IVPB  750 mg IntraVENous Q24H    amitriptyline (ELAVIL) tablet 100 mg  100 mg Oral QHS    0.9% sodium chloride infusion  125 mL/hr IntraVENous CONTINUOUS    ondansetron (ZOFRAN) injection 4 mg  4 mg IntraVENous Q4H PRN    docusate sodium (COLACE) capsule 100 mg  100 mg Oral BID PRN    acetaminophen (TYLENOL) tablet 650 mg  650 mg Oral Q6H PRN    pantoprazole (PROTONIX) injection 40 mg  40 mg IntraVENous DAILY PRN    albuterol-ipratropium (DUO-NEB) 2.5 MG-0.5 MG/3 ML  3 mL Nebulization Q6H PRN    melatonin tablet 12 mg  12 mg Oral QHS PRN    oxyCODONE-acetaminophen (PERCOCET) 5-325 mg per tablet 1 Tab  1 Tab Oral Q4H PRN    morphine injection 2 mg  2 mg IntraVENous Q3H PRN    prochlorperazine (COMPAZINE) injection 10 mg  10 mg IntraVENous Q6H PRN    levETIRAcetam (KEPPRA) tablet 1,500 mg  1,500 mg Oral BID    [START ON 7/25/2020] levothyroxine (SYNTHROID) tablet 88 mcg  88 mcg Oral Once per day on Sun Sat    levothyroxine (SYNTHROID) tablet 100 mcg  100 mcg Oral Once per day on Mon Tue Wed Thu Pastora Ramey DO  Internal Medicine, Hospitalist  Pager: [phone]  [facility]

## 2020-07-24 LAB
ANION GAP SERPL CALC-SCNC: 11 MMOL/L (ref 3–18)
BACTERIA SPEC CULT: NORMAL
BUN SERPL-MCNC: 6 MG/DL (ref 7–18)
BUN/CREAT SERPL: 9 (ref 12–20)
CALCIUM SERPL-MCNC: 8.1 MG/DL (ref 8.5–10.1)
CC UR VC: NORMAL
CHLORIDE SERPL-SCNC: 104 MMOL/L (ref 100–111)
CO2 SERPL-SCNC: 22 MMOL/L (ref 21–32)
CREAT SERPL-MCNC: 0.67 MG/DL (ref 0.6–1.3)
GLUCOSE SERPL-MCNC: 80 MG/DL (ref 74–99)
MAGNESIUM SERPL-MCNC: 2 MG/DL (ref 1.6–2.6)
POTASSIUM SERPL-SCNC: 3 MMOL/L (ref 3.5–5.5)
SERVICE CMNT-IMP: NORMAL
SODIUM SERPL-SCNC: 137 MMOL/L (ref 136–145)

## 2020-07-24 PROCEDURE — 74011250637 HC RX REV CODE- 250/637: Performed by: INTERNAL MEDICINE

## 2020-07-24 PROCEDURE — 80048 BASIC METABOLIC PNL TOTAL CA: CPT

## 2020-07-24 PROCEDURE — C9113 INJ PANTOPRAZOLE SODIUM, VIA: HCPCS | Performed by: INTERNAL MEDICINE

## 2020-07-24 PROCEDURE — 65270000029 HC RM PRIVATE

## 2020-07-24 PROCEDURE — 83735 ASSAY OF MAGNESIUM: CPT

## 2020-07-24 PROCEDURE — 74011250636 HC RX REV CODE- 250/636: Performed by: INTERNAL MEDICINE

## 2020-07-24 PROCEDURE — 74011250637 HC RX REV CODE- 250/637: Performed by: HOSPITALIST

## 2020-07-24 PROCEDURE — 36415 COLL VENOUS BLD VENIPUNCTURE: CPT

## 2020-07-24 RX ORDER — OXYCODONE AND ACETAMINOPHEN 5; 325 MG/1; MG/1
1 TABLET ORAL
Status: DISCONTINUED | OUTPATIENT
Start: 2020-07-24 | End: 2020-07-25 | Stop reason: HOSPADM

## 2020-07-24 RX ORDER — PANTOPRAZOLE SODIUM 40 MG/1
40 TABLET, DELAYED RELEASE ORAL DAILY
Status: DISCONTINUED | OUTPATIENT
Start: 2020-07-25 | End: 2020-07-25 | Stop reason: HOSPADM

## 2020-07-24 RX ORDER — MORPHINE SULFATE 2 MG/ML
2 INJECTION, SOLUTION INTRAMUSCULAR; INTRAVENOUS
Status: DISCONTINUED | OUTPATIENT
Start: 2020-07-24 | End: 2020-07-25 | Stop reason: HOSPADM

## 2020-07-24 RX ORDER — POTASSIUM CHLORIDE 20 MEQ/1
40 TABLET, EXTENDED RELEASE ORAL
Status: DISCONTINUED | OUTPATIENT
Start: 2020-07-24 | End: 2020-07-24 | Stop reason: CLARIF

## 2020-07-24 RX ORDER — SUMATRIPTAN 6 MG/.5ML
6 INJECTION, SOLUTION SUBCUTANEOUS ONCE
Status: COMPLETED | OUTPATIENT
Start: 2020-07-25 | End: 2020-07-25

## 2020-07-24 RX ADMIN — PANTOPRAZOLE SODIUM 40 MG: 40 INJECTION, POWDER, FOR SOLUTION INTRAVENOUS at 05:05

## 2020-07-24 RX ADMIN — AMITRIPTYLINE HYDROCHLORIDE 100 MG: 50 TABLET, FILM COATED ORAL at 21:09

## 2020-07-24 RX ADMIN — SODIUM CHLORIDE 125 ML/HR: 900 INJECTION, SOLUTION INTRAVENOUS at 10:34

## 2020-07-24 RX ADMIN — MORPHINE SULFATE 2 MG: 2 INJECTION, SOLUTION INTRAMUSCULAR; INTRAVENOUS at 01:04

## 2020-07-24 RX ADMIN — SODIUM CHLORIDE 125 ML/HR: 900 INJECTION, SOLUTION INTRAVENOUS at 21:09

## 2020-07-24 RX ADMIN — LORAZEPAM 1 MG: 1 TABLET ORAL at 05:05

## 2020-07-24 RX ADMIN — LEVETIRACETAM 1500 MG: 500 TABLET ORAL at 17:41

## 2020-07-24 RX ADMIN — LORAZEPAM 1 MG: 1 TABLET ORAL at 21:09

## 2020-07-24 RX ADMIN — LEVOFLOXACIN 750 MG: 5 INJECTION, SOLUTION INTRAVENOUS at 01:01

## 2020-07-24 RX ADMIN — OXYCODONE HYDROCHLORIDE AND ACETAMINOPHEN 1 TABLET: 5; 325 TABLET ORAL at 08:32

## 2020-07-24 RX ADMIN — OXYCODONE HYDROCHLORIDE AND ACETAMINOPHEN 1 TABLET: 5; 325 TABLET ORAL at 12:29

## 2020-07-24 RX ADMIN — ONDANSETRON 4 MG: 2 INJECTION INTRAMUSCULAR; INTRAVENOUS at 12:46

## 2020-07-24 RX ADMIN — BISACODYL 10 MG: 10 SUPPOSITORY RECTAL at 08:33

## 2020-07-24 RX ADMIN — LEVETIRACETAM 1500 MG: 500 TABLET ORAL at 08:32

## 2020-07-24 RX ADMIN — MORPHINE SULFATE 2 MG: 2 INJECTION, SOLUTION INTRAMUSCULAR; INTRAVENOUS at 15:04

## 2020-07-24 RX ADMIN — POTASSIUM BICARBONATE 40 MEQ: 782 TABLET, EFFERVESCENT ORAL at 14:22

## 2020-07-24 RX ADMIN — MORPHINE SULFATE 2 MG: 2 INJECTION, SOLUTION INTRAMUSCULAR; INTRAVENOUS at 11:22

## 2020-07-24 RX ADMIN — OXYCODONE HYDROCHLORIDE AND ACETAMINOPHEN 1 TABLET: 5; 325 TABLET ORAL at 16:49

## 2020-07-24 RX ADMIN — MORPHINE SULFATE 2 MG: 2 INJECTION, SOLUTION INTRAMUSCULAR; INTRAVENOUS at 05:05

## 2020-07-24 RX ADMIN — LORAZEPAM 1 MG: 1 TABLET ORAL at 14:21

## 2020-07-24 RX ADMIN — LEVOTHYROXINE SODIUM 100 MCG: 100 TABLET ORAL at 05:05

## 2020-07-24 RX ADMIN — ONDANSETRON 4 MG: 2 INJECTION INTRAMUSCULAR; INTRAVENOUS at 16:33

## 2020-07-24 NOTE — PROGRESS NOTES
1900  -- Bedside, Verbal and Written shift change report given to [address] (oncoming nurse) by Lyndon Daniels nurse). Report included the following information SBAR, Kardex, Intake/Output, MAR and Recent Results.       2110 -- PM medications administered, pt tolerated with ease, will continue to monitor.     0000 -- Shift reassessment, pt condition unchanged, will continue to monitor.      0330 --  Shift reassessment, pt condition unchanged, will continue to monitor.        0700  -- Bedside, Verbal and Written shift change report given to [address] nurse) by LILLI (offgoing nurse). Report included the following information SBAR, Kardex, Intake/Output, MAR and Recent Results. Skin assessment completed.

## 2020-07-24 NOTE — PROGRESS NOTES
General Surgery Consult    Stephen Lake  Admit date: 2020    MRN: [id number]     : 1990     Age: 31 y.o. Attending Physician: Porfirio Zayas MD, [facility]      History of Present Illness:      Stephen Lake is a 31 y.o. female who I am following for evaluation of chronic abdominal pain with nausea and vomiting. The patient is still complaining of nausea and generalized abdominal pain. She is still n.p.o. Her vital signs are normal with no fever or tachycardia. There is no WBC back from today. Patient Active Problem List    Diagnosis Date Noted    UTI (urinary tract infection) 2020    Nausea & vomiting 2020    Epilepsy ([facility] [address]     Cyclic vomiting syndrome 2019    GERD (gastroesophageal reflux disease) 2019    Hiatal hernia 2019    Acquired hypothyroidism 2019     Past Medical History:   Diagnosis Date    Cyclic vomiting syndrome     Epilepsy ([facility] [address]     GERD (gastroesophageal reflux disease)     Hiatal hernia       History reviewed. No pertinent surgical history.    Social History     Tobacco Use    Smoking status: Never Smoker    Smokeless tobacco: Never Used   Substance Use Topics    Alcohol use: Not Currently      Social History     Tobacco Use   Smoking Status Never Smoker   Smokeless Tobacco Never Used     Family History   Problem Relation Age of Onset    Osteoporosis Mother     Osteoporosis Maternal Grandmother       Current Facility-Administered Medications   Medication Dose Route Frequency    naloxone (NARCAN) injection 0.4 mg  0.4 mg IntraVENous PRN    LORazepam (ATIVAN) tablet 1 mg  1 mg Oral Q6H    bisacodyL (DULCOLAX) suppository 10 mg  10 mg Rectal DAILY    levoFLOXacin (LEVAQUIN) 750 mg in D5W IVPB  750 mg IntraVENous Q24H    amitriptyline (ELAVIL) tablet 100 mg  100 mg Oral QHS    0.9% sodium chloride infusion  125 mL/hr IntraVENous CONTINUOUS    ondansetron (ZOFRAN) injection 4 mg  4 mg IntraVENous Q4H PRN    docusate sodium (COLACE) capsule 100 mg  100 mg Oral BID PRN    acetaminophen (TYLENOL) tablet 650 mg  650 mg Oral Q6H PRN    pantoprazole (PROTONIX) injection 40 mg  40 mg IntraVENous DAILY PRN    albuterol-ipratropium (DUO-NEB) 2.5 MG-0.5 MG/3 ML  3 mL Nebulization Q6H PRN    melatonin tablet 12 mg  12 mg Oral QHS PRN    oxyCODONE-acetaminophen (PERCOCET) 5-325 mg per tablet 1 Tab  1 Tab Oral Q4H PRN    morphine injection 2 mg  2 mg IntraVENous Q3H PRN    prochlorperazine (COMPAZINE) injection 10 mg  10 mg IntraVENous Q6H PRN    levETIRAcetam (KEPPRA) tablet 1,500 mg  1,500 mg Oral BID    [START ON 7/25/2020] levothyroxine (SYNTHROID) tablet 88 mcg  88 mcg Oral Once per day on Sun Sat    levothyroxine (SYNTHROID) tablet 100 mcg  100 mcg Oral Once per day on Mon Tue Wed Thu Fri      Allergies   Allergen Reactions    Milk Containing Products Anaphylaxis    Sulfa (Sulfonamide Antibiotics) Anaphylaxis    Ciprofloxacin Nausea and Vomiting    Codeine Nausea and Vomiting    Doxycycline Hives    Gluten Swelling    Penicillins Hives        Review of Systems:  Pertinent items are noted in the History of Present Illness.     Objective:     Visit Vitals  /81 (BP 1 Location: Right arm, BP Patient Position: At rest)   Pulse 82   Temp 98.2 °F (36.8 °C)   Resp 18   Ht 5' 2\" (1.575 m)   Wt 77.1 kg (170 lb)   LMP 07/20/2020   SpO2 100%   BMI 31.09 kg/m²       Physical Exam:      General:  in no apparent distress, alert, oriented times 3, afebrile and normal vitals   Eyes:  conjunctivae and sclerae normal, pupils equal, round, reactive to light               Abdomen:   rounded, soft, nontender, nondistended, no masses or organomegaly               Imaging and Lab Review:     CBC:   Lab Results   Component Value Date/Time    WBC 17.6 (H) 07/23/2020 03:05 AM    RBC 3.76 (L) 07/23/2020 03:05 AM    HGB 11.2 (L) 07/23/2020 03:05 AM    HCT 34.7 (L) 07/23/2020 03:05 AM    PLATELET 390 84/29/0416 03:05 AM     BMP: Lab Results   Component Value Date/Time    Glucose 80 07/24/2020 03:25 AM    Sodium 137 07/24/2020 03:25 AM    Potassium 3.0 (L) 07/24/2020 03:25 AM    Chloride 104 07/24/2020 03:25 AM    CO2 22 07/24/2020 03:25 AM    BUN 6 (L) 07/24/2020 03:25 AM    Creatinine 0.67 07/24/2020 03:25 AM    Calcium 8.1 (L) 07/24/2020 03:25 AM     CMP:  Lab Results   Component Value Date/Time    Glucose 80 07/24/2020 03:25 AM    Sodium 137 07/24/2020 03:25 AM    Potassium 3.0 (L) 07/24/2020 03:25 AM    Chloride 104 07/24/2020 03:25 AM    CO2 22 07/24/2020 03:25 AM    BUN 6 (L) 07/24/2020 03:25 AM    Creatinine 0.67 07/24/2020 03:25 AM    Calcium 8.1 (L) 07/24/2020 03:25 AM    Anion gap 11 07/24/2020 03:25 AM    BUN/Creatinine ratio 9 (L) 07/24/2020 03:25 AM    Alk. phosphatase 80 07/23/2020 03:05 AM    Protein, total 6.9 07/23/2020 03:05 AM    Albumin 3.7 07/23/2020 03:05 AM    Globulin 3.2 07/23/2020 03:05 AM    A-G Ratio 1.2 07/23/2020 03:05 AM       Recent Results (from the past 24 hour(s))   METABOLIC PANEL, BASIC    Collection Time: 07/24/20  3:25 AM   Result Value Ref Range    Sodium 137 136 - 145 mmol/L    Potassium 3.0 (L) 3.5 - 5.5 mmol/L    Chloride 104 100 - 111 mmol/L    CO2 22 21 - 32 mmol/L    Anion gap 11 3.0 - 18 mmol/L    Glucose 80 74 - 99 mg/dL    BUN 6 (L) 7.0 - 18 MG/DL    Creatinine 0.67 0.6 - 1.3 MG/DL    BUN/Creatinine ratio 9 (L) 12 - 20      GFR est AA >60 >60 ml/min/1.73m2    GFR est non-AA >60 >60 ml/min/1.73m2    Calcium 8.1 (L) 8.5 - 10.1 MG/DL       images and reports reviewed    Assessment:   Cali Santiago is a 31 y.o. female who has a history of cyclic vomiting syndrome and she is presenting with a long history of abdominal pain with nausea and sometimes vomiting. This episode has been lasting for 2 weeks and she was supposed to follow-up with her PCP but because they could not accommodate her she was sent to urgent care but then she went to the emergency room.   Based on 2 CT scans that were done in 2 consecutive days there is no evidence of any colitis currently. Clearly there is no surgical issues currently and I explained that to the patient and she agreed. Plan:     I appreciate the medicine admission and management as well as the consultation  Follow GI recommendations  Based on the patient's history, physical exam, and CT scan, there is no need for any surgical intervention. Will sign off.  Please call me if you have any questions (cell phone: [phone])     Signed By: Anila Christensen MD     July 24, 2020

## 2020-07-24 NOTE — PROGRESS NOTES
Problem: Discharge Planning  Goal: *Discharge to safe environment  Outcome: Progressing Towards Goal  Plan is home    Surgery signed off. Patient being seen by GI and Hospitalist. Continuing IV fluids per notes. CM to follow for needs    Adelfo Fowler.  JAEL Flores  [facility]  [phone], Pager [phone]  [email or web address]

## 2020-07-24 NOTE — ROUTINE PROCESS
0745 Bedside and Verbal shift change report given to Naye Hinojosa RN (oncoming nurse) by Leah Martinez RN (offgoing nurse). Report included the following information SBAR, Kardex, Intake/Output and MAR. Pt received laying in bed. No distress. Complains of pain. 1230 Pt complains of pain and nausea after eating liquid diet. No emesis. Zofran given. Will continue to monitor    1630 Pt complains of nausea. No emesis. Zofran given. Will continue to monitor. 1800 Discussed PRN pain medication timing change with pt. Verbalized understanding and cooperative with decreasing use of opioid pain medications. 1920 Bedside and Verbal shift change report given to Leah Martinez RN (oncoming nurse) by Naye Hinojosa RN (offgoing nurse). Report included the following information SBAR, Kardex, Intake/Output and MAR.

## 2020-07-24 NOTE — PROGRESS NOTES
Problem: Discharge Planning  Goal: *Discharge to safe environment  7/24/2020 9977 by Roseanna Raman  Outcome: Progressing Towards Goal  7/24/2020 3567 by Roseanna Raman  Outcome: Progressing Towards Goal     Problem: Pain  Goal: *Control of Pain  7/24/2020 6413 by Roseanna Raman  Outcome: Progressing Towards Goal  7/24/2020 9213 by Roseanna Raman  Outcome: Progressing Towards Goal     Problem: Patient Education: Go to Patient Education Activity  Goal: Patient/Family Education  7/24/2020 2148 by Roseanna Raman  Outcome: Progressing Towards Goal  7/24/2020 8726 by Roseanna Raman  Outcome: Progressing Towards Goal     Problem: Falls - Risk of  Goal: *Absence of Falls  Description: Document Ligia Pena Fall Risk and appropriate interventions in the flowsheet.   7/24/2020 2501 by Roseanna Raman  Outcome: Progressing Towards Goal  Note: Fall Risk Interventions:            Medication Interventions: Evaluate medications/consider consulting pharmacy, Teach patient to arise slowly                7/24/2020 2011 by Roseanna Raman  Outcome: Progressing Towards Goal  Note: Fall Risk Interventions:            Medication Interventions: Evaluate medications/consider consulting pharmacy, Teach patient to arise slowly                   Problem: Patient Education: Go to Patient Education Activity  Goal: Patient/Family Education  7/24/2020 8539 by Roseanna Raman  Outcome: Progressing Towards Goal  7/24/2020 8308 by Roseanna Raman  Outcome: Progressing Towards Goal     Problem: Urinary Tract Infection - Adult  Goal: *Absence of infection signs and symptoms  Outcome: Progressing Towards Goal     Problem: Patient Education: Go to Patient Education Activity  Goal: Patient/Family Education  Outcome: Progressing Towards Goal     Problem: Seizure Disorder (Adult)  Goal: *STG: Remains free of seizure activity  Outcome: Progressing Towards Goal  Goal: *STG: Maintains lab values within therapeutic range  Outcome: Progressing Towards Goal  Goal: *STG/LTG: Complies with medication therapy  Outcome: Progressing Towards Goal  Goal: *STG: Remains free of injury during seizure activity  Outcome: Progressing Towards Goal  Goal: *STG: Remains safe in hospital  Outcome: Progressing Towards Goal  Goal: Interventions  Outcome: Progressing Towards Goal     Problem: Patient Education: Go to Patient Education Activity  Goal: Patient/Family Education  Outcome: Progressing Towards Goal

## 2020-07-24 NOTE — PROGRESS NOTES
Problem: Discharge Planning  Goal: *Discharge to safe environment  Outcome: Progressing Towards Goal     Problem: Pain  Goal: *Control of Pain  Outcome: Progressing Towards Goal     Problem: Patient Education: Go to Patient Education Activity  Goal: Patient/Family Education  Outcome: Progressing Towards Goal     Problem: Falls - Risk of  Goal: *Absence of Falls  Description: Document Rivera Tien Fall Risk and appropriate interventions in the flowsheet.   Outcome: Progressing Towards Goal  Note: Fall Risk Interventions:            Medication Interventions: Bed/chair exit alarm, Patient to call before getting OOB                   Problem: Patient Education: Go to Patient Education Activity  Goal: Patient/Family Education  Outcome: Progressing Towards Goal     Problem: Urinary Tract Infection - Adult  Goal: *Absence of infection signs and symptoms  Outcome: Progressing Towards Goal     Problem: Patient Education: Go to Patient Education Activity  Goal: Patient/Family Education  Outcome: Progressing Towards Goal     Problem: Seizure Disorder (Adult)  Goal: *STG: Remains free of seizure activity  Outcome: Progressing Towards Goal  Goal: *STG: Maintains lab values within therapeutic range  Outcome: Progressing Towards Goal  Goal: *STG/LTG: Complies with medication therapy  Outcome: Progressing Towards Goal  Goal: *STG: Remains free of injury during seizure activity  Outcome: Progressing Towards Goal  Goal: *STG: Remains safe in hospital  Outcome: Progressing Towards Goal  Goal: Interventions  Outcome: Progressing Towards Goal     Problem: Patient Education: Go to Patient Education Activity  Goal: Patient/Family Education  Outcome: Progressing Towards Goal

## 2020-07-24 NOTE — ROUTINE PROCESS
In chart to assist primary nurse. Evening meds administered at this time. Patient states that she only wants medications IV. Notified patient that her medications are orally. Patient verbalized 100% understanding. Took all medications orally without complications. Ambulates well to the bathroom without assistance or assistive devices.     Anne Marie Lee, RN, BSN

## 2020-07-24 NOTE — PROGRESS NOTES
Internal Medicine Progress Note    Patient's Name: Ramya Saba  Admit Date: 7/22/2020  Length of Stay: 2      Assessment/Plan     Active Hospital Problems    Diagnosis Date Noted    UTI (urinary tract infection) 07/22/2020    Nausea & vomiting 31/67/7099    Cyclic vomiting syndrome 09/16/2019     Diagnosed at [facility] in [place] as cerebral migraine and it triggers abdominal migraine  Multiple esophogeal tears due to vomiting  Last EGD 2016      Acquired hypothyroidism 09/16/2019     - Cont IVFs  - Full liquids at lunch which made her nausea return w/ abd pain, but no vomiting this time  - Will back down to clears, as she feels she can tolerate  - Appreciate GI input  - Ativan PRN  - Cont IVAB for UTI  - Urine cult w/o any organism  - Replete lytes  - Cont acceptable home medications for chronic conditions   - DVT protocol    I have personally reviewed all pertinent labs and films that have officially resulted over the last 24 hours. I have personally checked for all pending labs that are awaiting final results.     Subjective     Pt s/e @ bedside  No major events overnight  Was feeling better this AM, no further vomiting  After full liquids, had some nausea and pain return  Denies CP or SOB    Objective     Visit Vitals  /85 (BP 1 Location: Right arm, BP Patient Position: At rest)   Pulse 77   Temp 98.4 °F (36.9 °C)   Resp 18   Ht 5' 2\" (1.575 m)   Wt 77.1 kg (170 lb)   SpO2 99%   BMI 31.09 kg/m²       Physical Exam:  General Appearance: NAD, conversant  Lungs: CTA with normal respiratory effort  CV: RRR, no m/r/g  Abdomen: soft, mild TTP epigastric, normal bowel sounds  Extremities: no cyanosis, no peripheral edema  Neuro: No focal deficits, motor/sensory intact    Lab/Data Reviewed:  BMP:   Lab Results   Component Value Date/Time     07/24/2020 03:25 AM    K 3.0 (L) 07/24/2020 03:25 AM     07/24/2020 03:25 AM    CO2 22 07/24/2020 03:25 AM    AGAP 11 07/24/2020 03:25 AM    GLU 80 07/24/2020 03:25 AM    BUN 6 (L) 07/24/2020 03:25 AM    CREA 0.67 07/24/2020 03:25 AM    GFRAA >60 07/24/2020 03:25 AM    GFRNA >60 07/24/2020 03:25 AM     CBC:   No results found for: WBC, HGB, HGBEXT, HCT, HCTEXT, PLT, PLTEXT, HGBEXT, HCTEXT, PLTEXT    Imaging Reviewed:  No results found.     Medications Reviewed:  Current Facility-Administered Medications   Medication Dose Route Frequency    naloxone (NARCAN) injection 0.4 mg  0.4 mg IntraVENous PRN    LORazepam (ATIVAN) tablet 1 mg  1 mg Oral Q6H    bisacodyL (DULCOLAX) suppository 10 mg  10 mg Rectal DAILY    levoFLOXacin (LEVAQUIN) 750 mg in D5W IVPB  750 mg IntraVENous Q24H    amitriptyline (ELAVIL) tablet 100 mg  100 mg Oral QHS    0.9% sodium chloride infusion  125 mL/hr IntraVENous CONTINUOUS    ondansetron (ZOFRAN) injection 4 mg  4 mg IntraVENous Q4H PRN    docusate sodium (COLACE) capsule 100 mg  100 mg Oral BID PRN    acetaminophen (TYLENOL) tablet 650 mg  650 mg Oral Q6H PRN    pantoprazole (PROTONIX) injection 40 mg  40 mg IntraVENous DAILY PRN    albuterol-ipratropium (DUO-NEB) 2.5 MG-0.5 MG/3 ML  3 mL Nebulization Q6H PRN    melatonin tablet 12 mg  12 mg Oral QHS PRN    oxyCODONE-acetaminophen (PERCOCET) 5-325 mg per tablet 1 Tab  1 Tab Oral Q4H PRN    morphine injection 2 mg  2 mg IntraVENous Q3H PRN    prochlorperazine (COMPAZINE) injection 10 mg  10 mg IntraVENous Q6H PRN    levETIRAcetam (KEPPRA) tablet 1,500 mg  1,500 mg Oral BID    [START ON 7/25/2020] levothyroxine (SYNTHROID) tablet 88 mcg  88 mcg Oral Once per day on Sun Sat    levothyroxine (SYNTHROID) tablet 100 mcg  100 mcg Oral Once per day on Mon Tue Wed Thu Fri           Tiffany Alicia, DO  Internal Medicine, Hospitalist  Pager: [phone]  [facility]

## 2020-07-24 NOTE — PROGRESS NOTES
1900  -- Bedside, Verbal and Written shift change report given to [address] (oncoming nurse) by  (offgoing nurse). Allergy band placed on pt's wrist. Report included the following information SBAR, Kardex, Intake/Output, MAR and Recent Results. 2001 -- PRN N/V medications administered, pt tolerated with ease, will continue to monitor.       0015 -- Shift reassessment, pt condition unchanged, will continue to monitor. 0105 -- PRN pain medications administered, pt tolerated with ease, will continue to monitor.      0400 --  Shift reassessment, pt condition unchanged, will continue to monitor.      0500 -- PRN N/V medications administered, pt tolerated with ease, will continue to monitor.       4572 -- Bedside, Verbal and Written shift change report given to GINETTE  (oncoming nurse) by LILLI (offgoing nurse). Report included the following information SBAR, Kardex, Intake/Output, MAR and Recent Results. Skin assessment completed.

## 2020-07-24 NOTE — PROGRESS NOTES
Gastrointestinal Progress Note    Patient Name: Douglas Walker    PBSGV'J Date: 7/24/2020    Admit Date: 7/22/2020      Assessment:   1. Acute exacerbation of chronic cyclical vomiting syndrome likely precipitated by UTI, dehydration and constipation. 2.   Chronic epigastric abdominal pain/ abdominal migraines/ functional dyspepsia. 3.   Chronic idiopathic constipation. 4.   Recurrent UTI r/o Pyelonephritis. 5.   Hx of chronic esophageal reflux likely due to chronic vomiting. 6.   Hx of chronic cannabis use. Recommendation:   1. Give 2nd dose of Imitrex 6 mg SC in AM and continue Ativan 1 mg every 6 hours to abort her acute CVS flare-up. 2.   Continue IVF hydration and correct electrolyte abnormalities. 3.   Water and ice chips for now and gradually advance diet. 4.   Continue Amitriptylline 100 mg/day for CVS maintenance/prophylaxis. 5.   Minimize or STOP Percocet or any other narcotic medication. 6.   Prefer Compazine or Phenergan to control her nausea/vomiting and limit Zofran as this can promote more constipation. 7.   Continue Dulcolax Suppository daily to stimulate BMs. Subjective:   Patient felt better after 1 dose of Imitrex SC and round the clock Ativan. Tried to eat but immediately relapsed and not just on ice chips and water. Her abdominal pain has improved but nausea still lingers. No chest pain, tremors, shortness of breath or lightheadedness.     Current Facility-Administered Medications   Medication Dose Route Frequency    naloxone (NARCAN) injection 0.4 mg  0.4 mg IntraVENous PRN    LORazepam (ATIVAN) tablet 1 mg  1 mg Oral Q6H    bisacodyL (DULCOLAX) suppository 10 mg  10 mg Rectal DAILY    levoFLOXacin (LEVAQUIN) 750 mg in D5W IVPB  750 mg IntraVENous Q24H    amitriptyline (ELAVIL) tablet 100 mg  100 mg Oral QHS    0.9% sodium chloride infusion  125 mL/hr IntraVENous CONTINUOUS    ondansetron (ZOFRAN) injection 4 mg  4 mg IntraVENous Q4H PRN    docusate sodium (COLACE) capsule 100 mg  100 mg Oral BID PRN    acetaminophen (TYLENOL) tablet 650 mg  650 mg Oral Q6H PRN    pantoprazole (PROTONIX) injection 40 mg  40 mg IntraVENous DAILY PRN    albuterol-ipratropium (DUO-NEB) 2.5 MG-0.5 MG/3 ML  3 mL Nebulization Q6H PRN    melatonin tablet 12 mg  12 mg Oral QHS PRN    oxyCODONE-acetaminophen (PERCOCET) 5-325 mg per tablet 1 Tab  1 Tab Oral Q4H PRN    morphine injection 2 mg  2 mg IntraVENous Q3H PRN    prochlorperazine (COMPAZINE) injection 10 mg  10 mg IntraVENous Q6H PRN    levETIRAcetam (KEPPRA) tablet 1,500 mg  1,500 mg Oral BID    [START ON 7/25/2020] levothyroxine (SYNTHROID) tablet 88 mcg  88 mcg Oral Once per day on Sun Sat    levothyroxine (SYNTHROID) tablet 100 mcg  100 mcg Oral Once per day on Mon Tue Wed Thu Fri          Objective:     Visit Vitals  /87 (BP 1 Location: Left arm, BP Patient Position: At rest)   Pulse 75   Temp 98 °F (36.7 °C)   Resp 18   Ht 5' 2\" (1.575 m)   Wt 77.1 kg (170 lb)   LMP 07/20/2020   SpO2 100%   BMI 31.09 kg/m²           Intake/Output Summary (Last 24 hours) at 7/24/2020 1648  Last data filed at 7/24/2020 0609  Gross per 24 hour   Intake 5861.75 ml   Output    Net 5861.75 ml       Examination:  Awake, oriented, coherent. Anicteric sclerae. Abdomen with bowel sounds, nondistended, nontender, no mass or ascites. Warm extremities,  2+ pulses, no tremors. Data Review:    Labs: Results:   Chemistry Recent Labs     07/24/20  0325 07/23/20  0305 07/22/20  0020   GLU 80 104* 102*    139 140   K 3.0* 3.2* 3.5    106 109   CO2 22 27 25   BUN 6* 9 11   CREA 0.67 0.66 0.83   CA 8.1* 8.1* 8.6   AGAP 11 6 6   BUCR 9* 14 13   AP  --  80 101   TP  --  6.9 8.2   ALB  --  3.7 4.3   GLOB  --  3.2 3.9   AGRAT  --  1.2 1.1    Estimated Creatinine Clearance: 115 mL/min (by C-G formula based on SCr of 0.67 mg/dL).    CBC w/Diff Recent Labs     07/23/20  0305 07/22/20  0020   WBC 17.6* 20.2*   RBC 3.76* 4.17*   HGB 11.2* 12.4 HCT 34.7* 38.7    409   GRANS 77* 81*   LYMPH 15* 13*   EOS 0 0      Coagulation Recent Labs     07/23/20  0305   PTP 13.8   INR 1.1       Hepatitis Panel No results found for: HAMAT, HAAB, HABT, HAAT, HBSAG, HBSB, HBSAT, HBABN, HBCM, HBCAB, HBCAT, XBCABS, HBEAB, HBEAG, XHEPCS, [id number], HBEGLT, HBCMLT, HBCLT, HBEBLT, [id number], [id number], HAVMLT, [id number], HBCMLT, [id number], HCGAT   Amylase Lipase . Liver Enzymes Recent Labs     07/23/20  0305   TP 6.9   ALB 3.7   AP 80   ALT 34      Thyroid Studies No results for input(s): T4, T3U, TSH, TSHEXT in the last 72 hours. No lab exists for component: T3RU     Pathology pathology       Deacon Kelly MD, [address]  Pager: [phone]  July 24, 2020

## 2020-07-25 VITALS
TEMPERATURE: 98.6 F | BODY MASS INDEX: 31.28 KG/M2 | OXYGEN SATURATION: 100 % | RESPIRATION RATE: 18 BRPM | HEART RATE: 71 BPM | DIASTOLIC BLOOD PRESSURE: 78 MMHG | WEIGHT: 170 LBS | HEIGHT: 62 IN | SYSTOLIC BLOOD PRESSURE: 121 MMHG

## 2020-07-25 LAB
ANION GAP SERPL CALC-SCNC: 9 MMOL/L (ref 3–18)
BUN SERPL-MCNC: 5 MG/DL (ref 7–18)
BUN/CREAT SERPL: 8 (ref 12–20)
CALCIUM SERPL-MCNC: 7.9 MG/DL (ref 8.5–10.1)
CHLORIDE SERPL-SCNC: 108 MMOL/L (ref 100–111)
CO2 SERPL-SCNC: 26 MMOL/L (ref 21–32)
CREAT SERPL-MCNC: 0.65 MG/DL (ref 0.6–1.3)
GLUCOSE SERPL-MCNC: 57 MG/DL (ref 74–99)
POTASSIUM SERPL-SCNC: 3.1 MMOL/L (ref 3.5–5.5)
SODIUM SERPL-SCNC: 143 MMOL/L (ref 136–145)

## 2020-07-25 PROCEDURE — 74011250637 HC RX REV CODE- 250/637: Performed by: INTERNAL MEDICINE

## 2020-07-25 PROCEDURE — 36415 COLL VENOUS BLD VENIPUNCTURE: CPT

## 2020-07-25 PROCEDURE — 80048 BASIC METABOLIC PNL TOTAL CA: CPT

## 2020-07-25 PROCEDURE — 74011250636 HC RX REV CODE- 250/636: Performed by: INTERNAL MEDICINE

## 2020-07-25 PROCEDURE — 74011636637 HC RX REV CODE- 636/637: Performed by: INTERNAL MEDICINE

## 2020-07-25 PROCEDURE — 74011250637 HC RX REV CODE- 250/637: Performed by: HOSPITALIST

## 2020-07-25 RX ORDER — LEVOFLOXACIN 750 MG/1
750 TABLET ORAL DAILY
Qty: 3 TAB | Refills: 0 | Status: SHIPPED | OUTPATIENT
Start: 2020-07-25 | End: 2020-07-29

## 2020-07-25 RX ADMIN — BISACODYL 10 MG: 10 SUPPOSITORY RECTAL at 08:25

## 2020-07-25 RX ADMIN — LEVOFLOXACIN 750 MG: 5 INJECTION, SOLUTION INTRAVENOUS at 01:13

## 2020-07-25 RX ADMIN — ONDANSETRON 4 MG: 2 INJECTION INTRAMUSCULAR; INTRAVENOUS at 06:02

## 2020-07-25 RX ADMIN — LEVETIRACETAM 1500 MG: 500 TABLET ORAL at 08:24

## 2020-07-25 RX ADMIN — LORAZEPAM 1 MG: 1 TABLET ORAL at 14:20

## 2020-07-25 RX ADMIN — SODIUM CHLORIDE 125 ML/HR: 900 INJECTION, SOLUTION INTRAVENOUS at 06:02

## 2020-07-25 RX ADMIN — LORAZEPAM 1 MG: 1 TABLET ORAL at 08:24

## 2020-07-25 RX ADMIN — SUMATRIPTAN 6 MG: 6 INJECTION SUBCUTANEOUS at 08:30

## 2020-07-25 RX ADMIN — PANTOPRAZOLE SODIUM 40 MG: 40 TABLET, DELAYED RELEASE ORAL at 08:24

## 2020-07-25 RX ADMIN — LORAZEPAM 1 MG: 1 TABLET ORAL at 01:13

## 2020-07-25 RX ADMIN — POTASSIUM BICARBONATE 40 MEQ: 782 TABLET, EFFERVESCENT ORAL at 08:25

## 2020-07-25 NOTE — PROGRESS NOTES
Bedside and Verbal shift change report given to Tara Lambert RN (oncoming nurse) by Madalyn Lamas RN (offgoing nurse). Report included the following information SBAR, Kardex, Intake/Output, MAR and Recent Results. 1430-Reviewed discharge summary with patient, no questions or concerns at this time. 1455-Patient transport via wheelchair to front entrance.

## 2020-07-25 NOTE — PROGRESS NOTES
30 yo WF with CVS, chronic constipation, UTI. Pt alert and conversant. Feels hungry and is to try liquid diet. Imp: . Acute exacerbation of chronic cyclical vomiting syndrome likely precipitated by UTI, dehydration and constipation. 2.   Chronic epigastric abdominal pain/ abdominal migraines/ functional dyspepsia. 3.   Chronic idiopathic constipation. 4.   Recurrent UTI r/o Pyelonephritis. 5.   Hx of chronic esophageal reflux likely due to chronic vomiting. 6.   Hx of chronic cannabis use.     Plan: Case discussed with Dr. Karsten Lewis when tolerating diet            F/u with

## 2020-07-25 NOTE — PROGRESS NOTES
Problem: Discharge Planning  Goal: *Discharge to safe environment  Outcome: resolved/met  Plan is home    Pt dc'd home,no services ordered    Care Management Interventions  PCP Verified by CM: Yes  Palliative Care Criteria Met (RRAT>21 & CHF Dx)?: No  Mode of Transport at Discharge:  Other (see comment)(Maybe  , maybe a friend)  Transition of Care Consult (CM Consult): Discharge Planning  Current Support Network: Lives with Spouse(he is out on deployment right now- pt is working with Karina Wright to see if he can come home)  Confirm Follow Up Transport: Other (see comment)  The Plan for Transition of Care is Related to the Following Treatment Goals : resolution of acute symtoms  Discharge Location  Discharge Placement: Home

## 2020-07-25 NOTE — DISCHARGE SUMMARY
Internal Medicine Discharge Summary        Patient: En Najera    YOB: 1990    Age:  31 y.o. Admit Date: 7/22/2020    Discharge Date: 7/25/2020    LOS:  LOS: 3 days     Discharge To: Home    Consults: Gastroenterology and General Surgery    Admission Diagnoses: Colitis, acute [K52.9]  Nausea & vomiting [R11.2]  UTI (urinary tract infection) [N39.0]    Discharge Diagnoses:    Problem List as of 7/25/2020 Date Reviewed: 9/16/2019          Codes Class Noted - Resolved    UTI (urinary tract infection) ICD-10-CM: N39.0  ICD-9-CM: 599.0  7/22/2020 - Present        Nausea & vomiting ICD-10-CM: R11.2  ICD-9-CM: 787.01  7/22/2020 - Present        Epilepsy ([facility] [address] ICD-10-CM: G40.909  ICD-9-CM: 345.90  9/16/2019 - Present    Overview Signed 9/16/2019 12:23 PM by Ursula Yang NP     Diagnosed at 14, first tonic-clonic  Last tonic-clonic 5/27/19 7 min seizure, no auras             * (Principal) Cyclic vomiting syndrome ICD-10-CM: R11.15  ICD-9-CM: 536.2  9/16/2019 - Present    Overview Addendum 9/16/2019 12:20 PM by Ursula Yang NP     Diagnosed at [address] [place] [address] in [facility] as cerebral migraine and it triggers abdominal migraine  Multiple esophogeal tears due to vomiting  Last EGD 2016             GERD (gastroesophageal reflux disease) ICD-10-CM: K21.9  ICD-9-CM: 530.81  9/16/2019 - Present        Hiatal hernia ICD-10-CM: K44.9  ICD-9-CM: 553.3  9/16/2019 - Present    Overview Signed 9/16/2019 12:18 PM by Ursula Yang NP     2nd to cyclic vomiting syndrome             Acquired hypothyroidism ICD-10-CM: E03.9  ICD-9-CM: 244.9  9/16/2019 - Present              Discharge Condition:  Improved    Procedures: None         HPI: En Najera is a 31 y.o. female who presents to [facility] ER with complaint of Abdominal Pain and Vomiting.   Patient reports that she has been unable to tolerate food or drink for the last two days and has also had a constant, 10/10 intensity \"excruciating,\" diffuse abdominal pain and was seen in [facility] ER on 7/19/2020. Patient was noted to have Mild Colitis and was discharged home with anti-emetics and oral antibiotics. However, Patient's pain did not improve and Patient was not able to tolerate PO diet, and so Patient returned to [facility] ER. Patient reports that she has been having too numerous to count bilious emeses and is \"trying hard not to scream.\"  Patient complaints of constipation without a BM for \"days and days and days. \"  Patient thinks that Diphenhydramine may be making her symptoms worse when administered. Patient admits to have Cyclic Vomiting Syndrome, but that she does not exhibit abdominal pain when vomiting due to that condition. Patient does report that she takes medicinal marijuana. Patient denies previous episodes of colitis. Patient denies fevers, chills, diarrhea, cough, and sick contacts.     In [facility] ER, Patient is noted to have Heart Rate 100 bpm, WBC 20.2, HCG (-), Lactic Acid 1.6, Urinalysis Weakly positive for a UTI. [facility] ER Physician reports that Patient's CT Abdomen/Pelvis indications that Patient has a mild colitis no different from the CT Abdomen from the day before; however, Patient has now failed Out-Patient therapy. Patient was administered Haloperidol to mange nausea after Ondansetron and Diphenhydramine were tried.     Patient is admitted to [facility] Surgical Unit (Non-Covid-19 Cohort) for management of Acute Colitis with Nausea and Vomiting. Hospital Course:    Cyclic Vomiting Syndrome - Colitis ruled out on imaging. General surgery consulted but recommended GI consult. GI evaluated recommended sumatriptan SC daily for 2 doses and IV ativan q8 hours to break cycle. Recommended follow up with urology as outpatient to evaluate  tract given recurrent UTI. She was treated with IV fluids. She slowly got better and was tolerating full liquids on the day of discharge and eager to get back home.  Recommended that she stop smoking cannabis as well. Needs to follow up with her regular GI specialist/neurogastroenterologist as outpatient. UTI - Treated with IV levaquin. Transitioned to oral for 3 more days at discharge. The rest of the patient's chronic conditions were managed appropriately during their admission. They were medically stable at the time of discharge. Visit Vitals  /78 (BP 1 Location: Right arm, BP Patient Position: At rest)   Pulse 71   Temp 98.6 °F (37 °C)   Resp 18   Ht 5' 2\" (1.575 m)   Wt 77.1 kg (170 lb)   SpO2 100%   BMI 31.09 kg/m²       Physical Exam at Discharge:  General Appearance: NAD, conversant  HENT: normocephalic/atraumatic, moist mucus membranes  Lungs: CTA with normal respiratory effort  CV: RRR, no m/r/g  Abdomen: soft, non-tender, normal bowel sounds  Extremities: no cyanosis, no peripheral edema  Neuro: moves all extremities, no focal deficits  Psych: appropriate affect, alert and oriented to person, place and time    Labs Prior to Discharge:  Labs: Results:       Chemistry Recent Labs     07/25/20 0305 07/24/20  0325 07/23/20  0305   GLU 57* 80 104*    137 139   K 3.1* 3.0* 3.2*    104 106   CO2 26 22 27   BUN 5* 6* 9   CREA 0.65 0.67 0.66   CA 7.9* 8.1* 8.1*   AGAP 9 11 6   BUCR 8* 9* 14   AP  --   --  80   TP  --   --  6.9   ALB  --   --  3.7   GLOB  --   --  3.2   AGRAT  --   --  1.2      CBC w/Diff Recent Labs     07/23/20  0305   WBC 17.6*   RBC 3.76*   HGB 11.2*   HCT 34.7*      GRANS 77*   LYMPH 15*   EOS 0      Cardiac Enzymes No results for input(s): CPK, CKND1, VICTORINA in the last 72 hours. No lab exists for component: CKRMB, TROIP   Coagulation Recent Labs     07/23/20  0305   PTP 13.8   INR 1.1       Lipid Panel No results found for: CHOL, CHOLPOCT, CHOLX, CHLST, CHOLV, 002182, HDL, HDLP, LDL, LDLC, DLDLP, 370367, VLDLC, VLDL, TGLX, TRIGL, TRIGP, TGLPOCT, CHHD, CHHDX   BNP No results for input(s): BNPP in the last 72 hours.    Liver Enzymes Recent Labs     07/23/20  0305   TP 6.9   ALB 3.7   AP 80      Thyroid Studies No results found for: T4, T3U, TSH, TSHEXT         Significant Imaging:  Ct Abd Pelv W Cont    Result Date: 7/22/2020  EXAM: CT of the abdomen and pelvis CLINICAL INDICATION/HISTORY: Abdominal pain. COMPARISON: 7/20/2020 TECHNIQUE: Axial CT imaging of the abdomen and pelvis was performed with intravenous contrast. Multiplanar reformats were generated. One or more dose reduction techniques were used on this CT: automated exposure control, adjustment of the mAs and/or kVp according to patient size, and iterative reconstruction techniques. The specific techniques used on this CT exam have been documented in the patient's electronic medical record. Digital Imaging and Communications in Medicine (DICOM) format image data are available to nonaffiliated external healthcare facilities or entities on a secured, media free, reciprocally searchable basis with patient authorization for at least a 12-month period after this study. _______________ FINDINGS: LOWER CHEST: Unremarkable. LIVER, BILIARY: Liver is normal. No biliary dilation. Gallbladder is unremarkable. PANCREAS: Normal. SPLEEN: Normal. ADRENALS: Normal. KIDNEYS/URETERS/BLADDER: Minimal stranding around the mid left ureter. No nephrolithiasis or hydronephrosis. PELVIC ORGANS: Intrauterine device is in satisfactory position. No pelvic masses. LYMPH NODES: No enlarged lymph nodes. GASTROINTESTINAL TRACT: No bowel obstruction identified. Colon is nondistended limiting its evaluation. The appendix is normal. VASCULATURE: Unremarkable. BONES: No acute or aggressive osseous abnormalities identified. OTHER: None. _______________     IMPRESSION: 1. Colon is nondistended limiting evaluation for signs of colitis. No bowel obstruction identified. 2. Minimal stranding near the left ureter is nonspecific. Correlate for any signs or symptoms of urinary tract infection. No nephrolithiasis or hydronephrosis. Preliminary report was provided by radiology resident. Ct Abd Pelv W Cont    Result Date: 7/21/2020  EXAM: CT of the abdomen and pelvis INDICATION: cyclical vomiting -Additional: None COMPARISON: None. TECHNIQUE: Axial CT imaging of the abdomen and pelvis was performed after the uneventful administration of 93 mL of Isovue-300 intravenous contrast. Multiplanar reformats were generated. One or more dose reduction techniques were used on this CT: automated exposure control, adjustment of the mAs and/or kVp according to patient size, and iterative reconstruction techniques. The specific techniques used on this CT exam have been documented in the patient's electronic medical record. Digital Imaging and Communications in Medicine (DICOM) format image data are available to nonaffiliated external healthcare facilities or entities on a secure, media free, reciprocally searchable basis with patient authorization for at least a 12-month period after this study. _______________ FINDINGS: Lower Chest: Unremarkable. Liver, Biliary: Liver is normal. No biliary dilation. Gallbladder is unremarkable. Pancreas: Normal. Spleen: Normal. Adrenals: Normal. Kidneys: Normal. Lymph Nodes: No enlarged lymph nodes. Gastrointestinal Tract: No bowel dilatation is present. The transverse colon shows suggestion of mild wall thickening. This finding is nonspecific but could represent colitis. Pelvic Organs: Both ovaries appear of slightly low density suggesting multiple follicles present. The ovaries are of normal size. IUD is present within the uterus. Vasculature: Unremarkable. Bones: No acute or aggressive osseous abnormalities identified. Other: None. _______________     IMPRESSION: The transverse colon is not well distended. There is mild mucosal thickening suggested which is nonspecific but could represent colitis. Both ovaries, while of normal size, appear to demonstrate presence of multiple follicles.  Preliminary report provided by on-call radiology resident. Xr Chest Port    Result Date: 7/22/2020  EXAM: CHEST RADIOGRAPH CLINICAL INDICATION/HISTORY: Chest pain   > Additional: None COMPARISON: None TECHNIQUE: Portable frontal view of the chest _______________ FINDINGS: SUPPORT DEVICES: None. HEART AND MEDIASTINUM: Heart size is normal. LUNGS AND PLEURAL SPACES: No focal consolidation, effusion, or pneumothorax. BONES AND SOFT TISSUES: Unremarkable. _______________     IMPRESSION: No active cardiopulmonary disease. Discharge Medications:     Current Discharge Medication List      START taking these medications    Details   levoFLOXacin (Levaquin) 750 mg tablet Take 1 Tab by mouth daily for 3 days. Qty: 3 Tab, Refills: 0         CONTINUE these medications which have NOT CHANGED    Details   !! levothyroxine (SYNTHROID) 100 mcg tablet Take 100 mcg by mouth five (5) days a week. Monday - Friday      amitriptyline (ELAVIL) 100 mg tablet TAKE 1 TABLET NIGHTLY  Qty: 30 Tab, Refills: 0      levETIRAcetam (KEPPRA XR) 750 mg ER tablet Take 1 Tab by mouth four (4) times daily. Qty: 128 Tab, Refills: 0      !! levothyroxine (SYNTHROID) 88 mcg tablet Take 1 Tab by mouth Daily (before breakfast). Qty: 90 Tab, Refills: 1      rizatriptan (MAXALT-MLT) 10 mg disintegrating tablet Take 10 mg by mouth once as needed for Migraine. Take 1 tablet by mouth at onset of headache and repeat in 2 hours as needed. Max 2 tablets in 25 hours or 10 tablets in 1 week. senna (Senna) 8.6 mg tablet Take 1 Tab by mouth daily. Qty: 20 Tab, Refills: 0      polyethylene glycol (Miralax) 17 gram/dose powder Take 17 g by mouth daily. 1 tablespoon with 8 oz of water daily  Qty: 116 g, Refills: 0      levETIRAcetam (KEPPRA) 750 mg tablet Take 1 Tab by mouth four (4) times daily. Qty: 360 Tab, Refills: 1      ubidecarenone (COQ-10 PO) Take  by mouth. riboflavin, vitamin B2, 400 mg tab Take  by mouth.       cholecalciferol, vitamin D3, (VITAMIN D3 PO) Take  by mouth.      OTHER 400 mg. Indications: julio c-mag      fluticasone propionate (Flonase Allergy Relief) 50 mcg/actuation nasal spray 2 Sprays by Both Nostrils route daily as needed for Rhinitis. ondansetron (ZOFRAN ODT) 8 mg disintegrating tablet Take 8 mg by mouth every eight (8) hours as needed for Nausea. levonorgestrel (MIRENA) 20 mcg/24 hours (5 yrs) 52 mg IUD 1 Device by IntraUTERine route once. !! - Potential duplicate medications found. Please discuss with provider. STOP taking these medications       ciprofloxacin HCl (Cipro) 500 mg tablet Comments:   Reason for Stopping:         metroNIDAZOLE (FlagyL) 500 mg tablet Comments:   Reason for Stopping:               Activity: Activity as tolerated    Diet: Full liquids, advance as tolerated    Wound Care: None needed    Follow-up:   Please follow up with your PCP within 7 days to discuss your recent hospitalization. Patient to arrange.   GI in 1-2 weeks  Urology in 2-3 weeks         Total time spent including time spent on final examination and discharge discussion, discharge documentation and records reviewed and medication reconciliation: > 30 minutes    Violetta Segovia DO  Internal Medicine, Hospitalist  Pager: [phone]

## 2020-07-25 NOTE — DISCHARGE INSTRUCTIONS
DISCHARGE SUMMARY from Nurse    PATIENT INSTRUCTIONS:    After general anesthesia or intravenous sedation, for 24 hours or while taking prescription Narcotics:  · Limit your activities  · Do not drive and operate hazardous machinery  · Do not make important personal or business decisions  · Do  not drink alcoholic beverages  · If you have not urinated within 8 hours after discharge, please contact your surgeon on call. Report the following to your surgeon:  · Excessive pain, swelling, redness or odor of or around the surgical area  · Temperature over 100.5  · Nausea and vomiting lasting longer than 4 hours or if unable to take medications  · Any signs of decreased circulation or nerve impairment to extremity: change in color, persistent  numbness, tingling, coldness or increase pain  · Any questions    What to do at Home:  Recommended activity: Activity as tolerated,. If you experience any of the following symptoms listed on your discharge summary, please follow up with PCP. *  Please give a list of your current medications to your Primary Care Provider. *  Please update this list whenever your medications are discontinued, doses are      changed, or new medications (including over-the-counter products) are added. *  Please carry medication information at all times in case of emergency situations. These are general instructions for a healthy lifestyle:    No smoking/ No tobacco products/ Avoid exposure to second hand smoke  Surgeon General's Warning:  Quitting smoking now greatly reduces serious risk to your health.     Obesity, smoking, and sedentary lifestyle greatly increases your risk for illness    A healthy diet, regular physical exercise & weight monitoring are important for maintaining a healthy lifestyle    You may be retaining fluid if you have a history of heart failure or if you experience any of the following symptoms:  Weight gain of 3 pounds or more overnight or 5 pounds in a week, increased swelling in our hands or feet or shortness of breath while lying flat in bed. Please call your doctor as soon as you notice any of these symptoms; do not wait until your next office visit. Patient {ARMSHERRELL:[id number]}  Patient Education        Learning About Colitis  What is colitis? Colitis is swelling (inflammation) of the colon. The colon makes up most of the large intestine. Many conditions can cause colitis. What are some types of colitis? · Infectious colitis is a type that appears suddenly. It's caused by a bacteria or virus, such as salmonella, shigella, or campylobacter. · Ischemic colitis is caused by problems with blood flow to the colon. This can happen after surgery. It can also be caused by other health problems. · Microscopic colitis doesn't always have a clear cause. It can only be found with special tests. · Crohn's disease and ulcerative colitis are lifelong diseases. They can cause swelling, inflammation, and deep sores in the lining of the digestive tract. What are the symptoms? Symptoms may include fever, diarrhea that may be bloody, or belly pain. You may also have an urgent need to move your bowels or pain when you move your bowels. Or you may have bleeding from the rectum or weight loss. Your symptoms may depend on the type of colitis you have. For example, microscopic colitis may cause watery diarrhea. Sometimes symptoms go away on their own. If they don't go away, or if you have bleeding or severe pain, call your doctor right away. How is it diagnosed? You may need blood tests or a stool test. You also may need imaging tests like a CT scan. You may have a colonoscopy so that a doctor can look inside your colon. In some cases, the doctor may want to test a sample of tissue from the intestine. This test is called a biopsy. How is it treated? Treatment for colitis depends on the condition that is causing it. · Antibiotics may be used to treat an infection.   · Diet changes may help with symptoms. · Medicines can also help to relieve inflammation and control symptoms. · In some cases, surgery to remove parts of the intestine may be needed. Follow-up care is a key part of your treatment and safety. Be sure to make and go to all appointments, and call your doctor if you are having problems. It's also a good idea to know your test results and keep a list of the medicines you take. Where can you learn more? Go to http://blake-safia.info/  Enter C130 in the search box to learn more about \"Learning About Colitis. \"  Current as of: August 12, 2019               Content Version: 12.5  © 3813-3016 [employer]wise, [employer]. Care instructions adapted under license by [employer] (which disclaims liability or warranty for this information). If you have questions about a medical condition or this instruction, always ask your healthcare professional. [address] any warranty or liability for your use of this information. The discharge information has been reviewed with the patient. The patient verbalized understanding. Discharge medications reviewed with the patient and appropriate educational materials and side effects teaching were provided.   ___________________________________________________________________________________________________________________________________

## 2020-07-25 NOTE — PROGRESS NOTES
Problem: Discharge Planning  Goal: *Discharge to safe environment  7/24/2020 3260 by Lesvia Goodman  Outcome: Progressing Towards Goal  7/24/2020 1699 by Lesvia Goodman  Outcome: Progressing Towards Goal  7/24/2020 4517 by Lesvia Goodman  Outcome: Progressing Towards Goal     Problem: Pain  Goal: *Control of Pain  7/24/2020 5313 by Lesvia Goodman  Outcome: Progressing Towards Goal  7/24/2020 4509 by Lesvia Goodman  Outcome: Progressing Towards Goal  7/24/2020 0293 by Lesvia Goodman  Outcome: Progressing Towards Goal     Problem: Patient Education: Go to Patient Education Activity  Goal: Patient/Family Education  7/24/2020 3380 by Lesvia Goodman  Outcome: Progressing Towards Goal  7/24/2020 6945 by Lesvia Goodman  Outcome: Progressing Towards Goal  7/24/2020 2430 by Lesvia Goodman  Outcome: Progressing Towards Goal     Problem: Falls - Risk of  Goal: *Absence of Falls  Description: Document Tristian Armendariz Fall Risk and appropriate interventions in the flowsheet.   7/24/2020 3339 by Lesvia Goodman  Outcome: Progressing Towards Goal  Note: Fall Risk Interventions:            Medication Interventions: Bed/chair exit alarm, Patient to call before getting OOB                7/24/2020 3991 by Lesvia Goodman  Outcome: Progressing Towards Goal  Note: Fall Risk Interventions:            Medication Interventions: Evaluate medications/consider consulting pharmacy, Teach patient to arise slowly                7/24/2020 5661 by Lesvia Goodman  Outcome: Progressing Towards Goal  Note: Fall Risk Interventions:            Medication Interventions: Evaluate medications/consider consulting pharmacy, Teach patient to arise slowly                   Problem: Patient Education: Go to Patient Education Activity  Goal: Patient/Family Education  7/24/2020 3371 by Lesvia Goodman  Outcome: Progressing Towards Goal  7/24/2020 8565 by Lesvia Goodman  Outcome: Progressing Towards Goal  7/24/2020 9822 by Pierre Deed  Outcome: Progressing Towards Goal     Problem: Urinary Tract Infection - Adult  Goal: *Absence of infection signs and symptoms  7/24/2020 1291 by Artis FRAZIER  Outcome: Progressing Towards Goal  7/24/2020 4008 by Pierre Deed  Outcome: Progressing Towards Goal     Problem: Patient Education: Go to Patient Education Activity  Goal: Patient/Family Education  7/24/2020 1519 by Pierre Deed  Outcome: Progressing Towards Goal  7/24/2020 8596 by Pierre Deed  Outcome: Progressing Towards Goal     Problem: Seizure Disorder (Adult)  Goal: *STG: Remains free of seizure activity  7/24/2020 8240 by Pierre Deed  Outcome: Progressing Towards Goal  7/24/2020 4908 by Pierre Deed  Outcome: Progressing Towards Goal  Goal: *STG: Maintains lab values within therapeutic range  7/24/2020 7134 by Pierre Deed  Outcome: Progressing Towards Goal  7/24/2020 1269 by Pierre Deed  Outcome: Progressing Towards Goal  Goal: *STG/LTG: Complies with medication therapy  7/24/2020 3278 by Pierre Deed  Outcome: Progressing Towards Goal  7/24/2020 2666 by Pierre Deed  Outcome: Progressing Towards Goal  Goal: *STG: Remains free of injury during seizure activity  7/24/2020 9532 by Pierre Deed  Outcome: Progressing Towards Goal  7/24/2020 3232 by Pierre Deed  Outcome: Progressing Towards Goal  Goal: *STG: Remains safe in hospital  7/24/2020 8820 by Pierre Deed  Outcome: Progressing Towards Goal  7/24/2020 8881 by Pierre Deed  Outcome: Progressing Towards Goal  Goal: Interventions  7/24/2020 8889 by Pierre Deed  Outcome: Progressing Towards Goal  7/24/2020 8450 by Pierre Deed  Outcome: Progressing Towards Goal     Problem: Patient Education: Go to Patient Education Activity  Goal: Patient/Family Education  7/24/2020 9867 by Pierre Deed  Outcome: Progressing Towards Goal  7/24/2020 8154 by Pierre Deed  Outcome: Progressing Towards Goal

## 2020-07-27 ENCOUNTER — TELEPHONE (OUTPATIENT)
Dept: CASE MANAGEMENT | Age: 30
End: 2020-07-27

## 2020-07-27 NOTE — TELEPHONE ENCOUNTER
Patient contacted regarding recent discharge and COVID-19 risk   Care Coordinator contacted the patient by telephone to perform post discharge assessment. Verified name and  with patient as identifiers. Patient has following risk factors of: no known risk factors. Care Coordinator reviewed discharge instructions, medical action plan and red flags related to discharge diagnosis. Reviewed and educated them on any new and changed medications related to discharge diagnosis. Advised obtaining a 90-day supply of all daily and as-needed medications. Education provided regarding infection prevention, and signs and symptoms of COVID-19 and when to seek medical attention with patient who verbalized understanding. Discussed exposure protocols and quarantine from [address] you at higher risk for severe illness  and given an opportunity for questions and concerns. The patient agrees to contact the COVID-19 hotline [phone] or PCP office for questions related to their healthcare. Care Coordinator provided contact information for future reference. From CDC: Are you at higher risk for severe illness?  Wash your hands often.  Avoid close contact (6 feet, which is about two arm lengths) with people who are sick.  Put distance between yourself and other people if COVID-19 is spreading in your community.  Clean and disinfect frequently touched surfaces.  Avoid all cruise travel and non-essential air travel.  Call your healthcare professional if you have concerns about COVID-19 and your underlying condition or if you are sick. For more information on steps you can take to protect yourself, see CDC's How to Protect Yourself      Patient/family/caregiver given information for [facility] and agrees to enroll no  Patient's preferred e-mail:    Patient's preferred phone number:   Based on Loop alert triggers, patient will be contacted by nurse care manager for worsening symptoms.     Plan for follow-up call in 7-14 days based on severity of symptoms and risk factors.

## 2020-07-28 LAB
BACTERIA SPEC CULT: NORMAL
BACTERIA SPEC CULT: NORMAL
SERVICE CMNT-IMP: NORMAL
SERVICE CMNT-IMP: NORMAL

## 2020-07-29 ENCOUNTER — HOSPITAL ENCOUNTER (OUTPATIENT)
Age: 30
Setting detail: OBSERVATION
Discharge: HOME | End: 2020-07-30
Attending: EMERGENCY MEDICINE | Admitting: INTERNAL MEDICINE
Payer: OTHER GOVERNMENT

## 2020-07-29 ENCOUNTER — APPOINTMENT (OUTPATIENT)
Dept: MRI IMAGING | Age: 30
End: 2020-07-29
Attending: INTERNAL MEDICINE
Payer: OTHER GOVERNMENT

## 2020-07-29 ENCOUNTER — APPOINTMENT (OUTPATIENT)
Dept: CT IMAGING | Age: 30
End: 2020-07-29
Attending: EMERGENCY MEDICINE
Payer: OTHER GOVERNMENT

## 2020-07-29 DIAGNOSIS — G43.709 CHRONIC MIGRAINE WITHOUT AURA WITHOUT STATUS MIGRAINOSUS, NOT INTRACTABLE: ICD-10-CM

## 2020-07-29 DIAGNOSIS — G40.919 BREAKTHROUGH SEIZURE (HCC): Primary | ICD-10-CM

## 2020-07-29 PROBLEM — R56.9 SEIZURE (HCC): Status: ACTIVE | Noted: 2020-07-29

## 2020-07-29 PROBLEM — G43.909 MIGRAINE: Status: ACTIVE | Noted: 2020-07-29

## 2020-07-29 PROBLEM — F19.90 SUBSTANCE USE DISORDER: Status: ACTIVE | Noted: 2020-07-29

## 2020-07-29 PROBLEM — E66.9 OBESITY: Status: ACTIVE | Noted: 2020-07-29

## 2020-07-29 LAB
ALBUMIN SERPL-MCNC: 4.3 G/DL (ref 3.4–5)
ALBUMIN/GLOB SERPL: 0.9 {RATIO} (ref 0.8–1.7)
ALP SERPL-CCNC: 104 U/L (ref 45–117)
ALT SERPL-CCNC: 48 U/L (ref 13–56)
AMPHET UR QL SCN: NEGATIVE
ANION GAP SERPL CALC-SCNC: 1 MMOL/L (ref 3–18)
APPEARANCE UR: CLEAR
AST SERPL-CCNC: 34 U/L (ref 10–38)
BARBITURATES UR QL SCN: NEGATIVE
BASOPHILS # BLD: 0 K/UL (ref 0–0.1)
BASOPHILS NFR BLD: 0 % (ref 0–2)
BENZODIAZ UR QL: NEGATIVE
BILIRUB SERPL-MCNC: 0.3 MG/DL (ref 0.2–1)
BILIRUB UR QL: NEGATIVE
BUN SERPL-MCNC: 6 MG/DL (ref 7–18)
BUN/CREAT SERPL: 6 (ref 12–20)
CALCIUM SERPL-MCNC: 9.4 MG/DL (ref 8.5–10.1)
CANNABINOIDS UR QL SCN: POSITIVE
CHLORIDE SERPL-SCNC: 104 MMOL/L (ref 100–111)
CO2 SERPL-SCNC: 29 MMOL/L (ref 21–32)
COCAINE UR QL SCN: NEGATIVE
COLOR UR: YELLOW
CREAT SERPL-MCNC: 0.93 MG/DL (ref 0.6–1.3)
DIFFERENTIAL METHOD BLD: ABNORMAL
EOSINOPHIL # BLD: 0.4 K/UL (ref 0–0.4)
EOSINOPHIL NFR BLD: 3 % (ref 0–5)
ERYTHROCYTE [DISTWIDTH] IN BLOOD BY AUTOMATED COUNT: 13.7 % (ref 11.6–14.5)
GLOBULIN SER CALC-MCNC: 4.6 G/DL (ref 2–4)
GLUCOSE SERPL-MCNC: 94 MG/DL (ref 74–99)
GLUCOSE UR STRIP.AUTO-MCNC: NEGATIVE MG/DL
HCG UR QL: NEGATIVE
HCT VFR BLD AUTO: 44.6 % (ref 35–45)
HDSCOM,HDSCOM: ABNORMAL
HGB BLD-MCNC: 14.6 G/DL (ref 12–16)
HGB UR QL STRIP: NEGATIVE
KETONES UR QL STRIP.AUTO: NEGATIVE MG/DL
LEUKOCYTE ESTERASE UR QL STRIP.AUTO: NEGATIVE
LYMPHOCYTES # BLD: 1.6 K/UL (ref 0.9–3.6)
LYMPHOCYTES NFR BLD: 11 % (ref 21–52)
MCH RBC QN AUTO: 30.2 PG (ref 24–34)
MCHC RBC AUTO-ENTMCNC: 32.7 G/DL (ref 31–37)
MCV RBC AUTO: 92.3 FL (ref 74–97)
METHADONE UR QL: NEGATIVE
MONOCYTES # BLD: 0.9 K/UL (ref 0.05–1.2)
MONOCYTES NFR BLD: 6 % (ref 3–10)
NEUTS SEG # BLD: 11.8 K/UL (ref 1.8–8)
NEUTS SEG NFR BLD: 80 % (ref 40–73)
NITRITE UR QL STRIP.AUTO: NEGATIVE
OPIATES UR QL: NEGATIVE
PCP UR QL: POSITIVE
PH UR STRIP: 8.5 [PH] (ref 5–8)
PLATELET # BLD AUTO: 487 K/UL (ref 135–420)
PMV BLD AUTO: 11.1 FL (ref 9.2–11.8)
POTASSIUM SERPL-SCNC: 4.6 MMOL/L (ref 3.5–5.5)
PROT SERPL-MCNC: 8.9 G/DL (ref 6.4–8.2)
PROT UR STRIP-MCNC: NEGATIVE MG/DL
RBC # BLD AUTO: 4.83 M/UL (ref 4.2–5.3)
SODIUM SERPL-SCNC: 134 MMOL/L (ref 136–145)
SP GR UR REFRACTOMETRY: 1.01 (ref 1–1.03)
UROBILINOGEN UR QL STRIP.AUTO: 0.2 EU/DL (ref 0.2–1)
WBC # BLD AUTO: 14.7 K/UL (ref 4.6–13.2)

## 2020-07-29 PROCEDURE — 74011000258 HC RX REV CODE- 258: Performed by: EMERGENCY MEDICINE

## 2020-07-29 PROCEDURE — 74011250636 HC RX REV CODE- 250/636: Performed by: INTERNAL MEDICINE

## 2020-07-29 PROCEDURE — 80053 COMPREHEN METABOLIC PANEL: CPT

## 2020-07-29 PROCEDURE — 74011250636 HC RX REV CODE- 250/636: Performed by: EMERGENCY MEDICINE

## 2020-07-29 PROCEDURE — 96374 THER/PROPH/DIAG INJ IV PUSH: CPT

## 2020-07-29 PROCEDURE — 74011000258 HC RX REV CODE- 258: Performed by: INTERNAL MEDICINE

## 2020-07-29 PROCEDURE — 96376 TX/PRO/DX INJ SAME DRUG ADON: CPT

## 2020-07-29 PROCEDURE — 96375 TX/PRO/DX INJ NEW DRUG ADDON: CPT

## 2020-07-29 PROCEDURE — 93005 ELECTROCARDIOGRAM TRACING: CPT

## 2020-07-29 PROCEDURE — 74011250637 HC RX REV CODE- 250/637: Performed by: HOSPITALIST

## 2020-07-29 PROCEDURE — 80177 DRUG SCRN QUAN LEVETIRACETAM: CPT

## 2020-07-29 PROCEDURE — 81025 URINE PREGNANCY TEST: CPT

## 2020-07-29 PROCEDURE — 99285 EMERGENCY DEPT VISIT HI MDM: CPT

## 2020-07-29 PROCEDURE — 81003 URINALYSIS AUTO W/O SCOPE: CPT

## 2020-07-29 PROCEDURE — 65660000000 HC RM CCU STEPDOWN

## 2020-07-29 PROCEDURE — 80307 DRUG TEST PRSMV CHEM ANLYZR: CPT

## 2020-07-29 PROCEDURE — 85025 COMPLETE CBC W/AUTO DIFF WBC: CPT

## 2020-07-29 PROCEDURE — 74011250637 HC RX REV CODE- 250/637: Performed by: INTERNAL MEDICINE

## 2020-07-29 PROCEDURE — 70450 CT HEAD/BRAIN W/O DYE: CPT

## 2020-07-29 PROCEDURE — 74011250636 HC RX REV CODE- 250/636: Performed by: HOSPITALIST

## 2020-07-29 RX ORDER — LORAZEPAM 2 MG/ML
1 INJECTION INTRAMUSCULAR
Status: COMPLETED | OUTPATIENT
Start: 2020-07-29 | End: 2020-07-29

## 2020-07-29 RX ORDER — ONDANSETRON 2 MG/ML
4 INJECTION INTRAMUSCULAR; INTRAVENOUS
Status: COMPLETED | OUTPATIENT
Start: 2020-07-29 | End: 2020-07-29

## 2020-07-29 RX ORDER — FLUTICASONE PROPIONATE 50 MCG
2 SPRAY, SUSPENSION (ML) NASAL
Status: DISCONTINUED | OUTPATIENT
Start: 2020-07-29 | End: 2020-07-30 | Stop reason: HOSPADM

## 2020-07-29 RX ORDER — AMITRIPTYLINE HYDROCHLORIDE 50 MG/1
50 TABLET, FILM COATED ORAL
COMMUNITY

## 2020-07-29 RX ORDER — LEVOTHYROXINE SODIUM 88 UG/1
88 TABLET ORAL
Status: DISCONTINUED | OUTPATIENT
Start: 2020-08-01 | End: 2020-07-30 | Stop reason: HOSPADM

## 2020-07-29 RX ORDER — LEVETIRACETAM 750 MG/1
3000 TABLET, EXTENDED RELEASE ORAL
Status: DISCONTINUED | OUTPATIENT
Start: 2020-07-29 | End: 2020-07-29 | Stop reason: CLARIF

## 2020-07-29 RX ORDER — SODIUM CHLORIDE 0.9 % (FLUSH) 0.9 %
5-40 SYRINGE (ML) INJECTION EVERY 8 HOURS
Status: DISCONTINUED | OUTPATIENT
Start: 2020-07-29 | End: 2020-07-30 | Stop reason: HOSPADM

## 2020-07-29 RX ORDER — LEVOTHYROXINE SODIUM 88 UG/1
88 TABLET ORAL
COMMUNITY

## 2020-07-29 RX ORDER — SUMATRIPTAN 50 MG/1
50 TABLET, FILM COATED ORAL
Status: COMPLETED | OUTPATIENT
Start: 2020-07-29 | End: 2020-07-29

## 2020-07-29 RX ORDER — RIZATRIPTAN BENZOATE 10 MG/1
10 TABLET, ORALLY DISINTEGRATING ORAL
Status: DISCONTINUED | OUTPATIENT
Start: 2020-07-29 | End: 2020-07-30 | Stop reason: HOSPADM

## 2020-07-29 RX ORDER — LORAZEPAM 2 MG/ML
2 INJECTION INTRAMUSCULAR
Status: DISCONTINUED | OUTPATIENT
Start: 2020-07-29 | End: 2020-07-30 | Stop reason: HOSPADM

## 2020-07-29 RX ORDER — ONDANSETRON 2 MG/ML
INJECTION INTRAMUSCULAR; INTRAVENOUS
Status: DISPENSED
Start: 2020-07-29 | End: 2020-07-30

## 2020-07-29 RX ORDER — SENNOSIDES 8.6 MG/1
1 TABLET ORAL DAILY
Status: DISCONTINUED | OUTPATIENT
Start: 2020-07-30 | End: 2020-07-30 | Stop reason: HOSPADM

## 2020-07-29 RX ORDER — SODIUM CHLORIDE 9 MG/ML
75 INJECTION, SOLUTION INTRAVENOUS CONTINUOUS
Status: DISCONTINUED | OUTPATIENT
Start: 2020-07-29 | End: 2020-07-30

## 2020-07-29 RX ORDER — METOCLOPRAMIDE HYDROCHLORIDE 5 MG/ML
10 INJECTION INTRAMUSCULAR; INTRAVENOUS
Status: COMPLETED | OUTPATIENT
Start: 2020-07-29 | End: 2020-07-29

## 2020-07-29 RX ORDER — KETOROLAC TROMETHAMINE 30 MG/ML
30 INJECTION, SOLUTION INTRAMUSCULAR; INTRAVENOUS
Status: DISCONTINUED | OUTPATIENT
Start: 2020-07-29 | End: 2020-07-30 | Stop reason: HOSPADM

## 2020-07-29 RX ORDER — MORPHINE SULFATE 2 MG/ML
4 INJECTION, SOLUTION INTRAMUSCULAR; INTRAVENOUS ONCE
Status: COMPLETED | OUTPATIENT
Start: 2020-07-29 | End: 2020-07-29

## 2020-07-29 RX ORDER — LEVETIRACETAM 500 MG/1
1500 TABLET ORAL 2 TIMES DAILY
Status: DISCONTINUED | OUTPATIENT
Start: 2020-07-29 | End: 2020-07-30 | Stop reason: HOSPADM

## 2020-07-29 RX ORDER — LEVETIRACETAM 750 MG/1
3000 TABLET, EXTENDED RELEASE ORAL
COMMUNITY

## 2020-07-29 RX ORDER — POLYETHYLENE GLYCOL 3350 17 G/17G
17 POWDER, FOR SOLUTION ORAL DAILY
Status: DISCONTINUED | OUTPATIENT
Start: 2020-07-30 | End: 2020-07-30 | Stop reason: HOSPADM

## 2020-07-29 RX ORDER — LORAZEPAM 2 MG/ML
INJECTION INTRAMUSCULAR
Status: DISPENSED
Start: 2020-07-29 | End: 2020-07-30

## 2020-07-29 RX ORDER — ONDANSETRON 2 MG/ML
4 INJECTION INTRAMUSCULAR; INTRAVENOUS
Status: DISCONTINUED | OUTPATIENT
Start: 2020-07-29 | End: 2020-07-30 | Stop reason: HOSPADM

## 2020-07-29 RX ORDER — KETOROLAC TROMETHAMINE 30 MG/ML
15 INJECTION, SOLUTION INTRAMUSCULAR; INTRAVENOUS
Status: COMPLETED | OUTPATIENT
Start: 2020-07-29 | End: 2020-07-29

## 2020-07-29 RX ORDER — AMITRIPTYLINE HYDROCHLORIDE 25 MG/1
50 TABLET, FILM COATED ORAL
Status: DISCONTINUED | OUTPATIENT
Start: 2020-07-29 | End: 2020-07-30 | Stop reason: HOSPADM

## 2020-07-29 RX ADMIN — KETOROLAC TROMETHAMINE 15 MG: 30 INJECTION, SOLUTION INTRAMUSCULAR; INTRAVENOUS at 12:43

## 2020-07-29 RX ADMIN — ONDANSETRON 4 MG: 2 SOLUTION INTRAMUSCULAR; INTRAVENOUS at 19:19

## 2020-07-29 RX ADMIN — LEVETIRACETAM 1000 MG: 100 INJECTION INTRAVENOUS at 12:46

## 2020-07-29 RX ADMIN — THIAMINE HYDROCHLORIDE 100 MG: 100 INJECTION, SOLUTION INTRAMUSCULAR; INTRAVENOUS at 22:58

## 2020-07-29 RX ADMIN — SODIUM CHLORIDE 75 ML/HR: 900 INJECTION, SOLUTION INTRAVENOUS at 21:00

## 2020-07-29 RX ADMIN — Medication 10 ML: at 14:21

## 2020-07-29 RX ADMIN — SODIUM CHLORIDE 1000 ML: 900 INJECTION, SOLUTION INTRAVENOUS at 12:46

## 2020-07-29 RX ADMIN — LEVETIRACETAM 1500 MG: 500 TABLET ORAL at 22:52

## 2020-07-29 RX ADMIN — METOCLOPRAMIDE 10 MG: 5 INJECTION, SOLUTION INTRAMUSCULAR; INTRAVENOUS at 12:43

## 2020-07-29 RX ADMIN — LORAZEPAM 2 MG: 2 INJECTION INTRAMUSCULAR at 12:30

## 2020-07-29 RX ADMIN — ONDANSETRON 4 MG: 2 INJECTION INTRAMUSCULAR; INTRAVENOUS at 12:52

## 2020-07-29 RX ADMIN — MORPHINE SULFATE 4 MG: 2 INJECTION, SOLUTION INTRAMUSCULAR; INTRAVENOUS at 14:20

## 2020-07-29 RX ADMIN — AMITRIPTYLINE HYDROCHLORIDE 50 MG: 25 TABLET, FILM COATED ORAL at 22:52

## 2020-07-29 RX ADMIN — LORAZEPAM 1 MG: 2 INJECTION INTRAMUSCULAR at 12:39

## 2020-07-29 RX ADMIN — SUMATRIPTAN SUCCINATE 50 MG: 50 TABLET ORAL at 19:55

## 2020-07-29 RX ADMIN — Medication 10 ML: at 22:52

## 2020-07-29 RX ADMIN — KETOROLAC TROMETHAMINE 30 MG: 30 INJECTION, SOLUTION INTRAMUSCULAR at 20:26

## 2020-07-29 NOTE — ED NOTES
Spoke to [place] to give report. .per RN nurse is not ready she will have nurse call me back. Will continue to monitor.

## 2020-07-29 NOTE — ED NOTES
Pt began having seizure while ED tech was at bedside. . MD came to bedside and gave VO for IM or IV ativan. IV access obtained and ativan given IV. Pt postictal. Pt compliant with treatments and is sitting in stretcher.

## 2020-07-29 NOTE — ED TRIAGE NOTES
Takes her seizure medicine at night and did not sleep well.   Seizure  Bit tongue   No incontinence    Patient arrives alert and oriented
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
## 2020-07-30 ENCOUNTER — APPOINTMENT (OUTPATIENT)
Dept: MRI IMAGING | Age: 30
End: 2020-07-30
Attending: INTERNAL MEDICINE
Payer: OTHER GOVERNMENT

## 2020-07-30 VITALS
DIASTOLIC BLOOD PRESSURE: 70 MMHG | SYSTOLIC BLOOD PRESSURE: 118 MMHG | BODY MASS INDEX: 33.88 KG/M2 | HEIGHT: 62 IN | OXYGEN SATURATION: 97 % | HEART RATE: 105 BPM | RESPIRATION RATE: 18 BRPM | TEMPERATURE: 99 F | WEIGHT: 184.1 LBS

## 2020-07-30 LAB
ALBUMIN SERPL-MCNC: 3.3 G/DL (ref 3.4–5)
ALBUMIN/GLOB SERPL: 1.1 {RATIO} (ref 0.8–1.7)
ALP SERPL-CCNC: 83 U/L (ref 45–117)
ALT SERPL-CCNC: 45 U/L (ref 13–56)
AMMONIA PLAS-SCNC: 49 UMOL/L (ref 11–32)
ANION GAP SERPL CALC-SCNC: 7 MMOL/L (ref 3–18)
AST SERPL-CCNC: 36 U/L (ref 10–38)
ATRIAL RATE: 94 BPM
ATRIAL RATE: 99 BPM
BASOPHILS # BLD: 0 K/UL (ref 0–0.1)
BASOPHILS NFR BLD: 0 % (ref 0–2)
BILIRUB DIRECT SERPL-MCNC: 0.1 MG/DL (ref 0–0.2)
BILIRUB SERPL-MCNC: 0.5 MG/DL (ref 0.2–1)
BUN SERPL-MCNC: 7 MG/DL (ref 7–18)
BUN/CREAT SERPL: 8 (ref 12–20)
CALCIUM SERPL-MCNC: 8.3 MG/DL (ref 8.5–10.1)
CALCULATED P AXIS, ECG09: 24 DEGREES
CALCULATED P AXIS, ECG09: 43 DEGREES
CALCULATED R AXIS, ECG10: 79 DEGREES
CALCULATED R AXIS, ECG10: 92 DEGREES
CALCULATED T AXIS, ECG11: 47 DEGREES
CALCULATED T AXIS, ECG11: 57 DEGREES
CHLORIDE SERPL-SCNC: 108 MMOL/L (ref 100–111)
CO2 SERPL-SCNC: 25 MMOL/L (ref 21–32)
CREAT SERPL-MCNC: 0.84 MG/DL (ref 0.6–1.3)
DIAGNOSIS, 93000: NORMAL
DIAGNOSIS, 93000: NORMAL
DIFFERENTIAL METHOD BLD: ABNORMAL
EOSINOPHIL # BLD: 0.2 K/UL (ref 0–0.4)
EOSINOPHIL NFR BLD: 2 % (ref 0–5)
ERYTHROCYTE [DISTWIDTH] IN BLOOD BY AUTOMATED COUNT: 13.8 % (ref 11.6–14.5)
EST. AVERAGE GLUCOSE BLD GHB EST-MCNC: 103 MG/DL
GLOBULIN SER CALC-MCNC: 3 G/DL (ref 2–4)
GLUCOSE SERPL-MCNC: 84 MG/DL (ref 74–99)
HBA1C MFR BLD: 5.2 % (ref 4.2–5.6)
HCT VFR BLD AUTO: 35.1 % (ref 35–45)
HGB BLD-MCNC: 11.3 G/DL (ref 12–16)
INR PPP: 1.1 (ref 0.8–1.2)
LYMPHOCYTES # BLD: 3.7 K/UL (ref 0.9–3.6)
LYMPHOCYTES NFR BLD: 28 % (ref 21–52)
MAGNESIUM SERPL-MCNC: 2.4 MG/DL (ref 1.6–2.6)
MCH RBC QN AUTO: 29.8 PG (ref 24–34)
MCHC RBC AUTO-ENTMCNC: 32.2 G/DL (ref 31–37)
MCV RBC AUTO: 92.6 FL (ref 74–97)
MONOCYTES # BLD: 1 K/UL (ref 0.05–1.2)
MONOCYTES NFR BLD: 7 % (ref 3–10)
NEUTS SEG # BLD: 8.5 K/UL (ref 1.8–8)
NEUTS SEG NFR BLD: 63 % (ref 40–73)
P-R INTERVAL, ECG05: 118 MS
P-R INTERVAL, ECG05: 124 MS
PHOSPHATE SERPL-MCNC: 2.9 MG/DL (ref 2.5–4.9)
PLATELET # BLD AUTO: 383 K/UL (ref 135–420)
PMV BLD AUTO: 11.2 FL (ref 9.2–11.8)
POTASSIUM SERPL-SCNC: 4 MMOL/L (ref 3.5–5.5)
PROT SERPL-MCNC: 6.3 G/DL (ref 6.4–8.2)
PROTHROMBIN TIME: 14.4 SEC (ref 11.5–15.2)
Q-T INTERVAL, ECG07: 330 MS
Q-T INTERVAL, ECG07: 340 MS
QRS DURATION, ECG06: 80 MS
QRS DURATION, ECG06: 82 MS
QTC CALCULATION (BEZET), ECG08: 412 MS
QTC CALCULATION (BEZET), ECG08: 436 MS
RBC # BLD AUTO: 3.79 M/UL (ref 4.2–5.3)
SODIUM SERPL-SCNC: 140 MMOL/L (ref 136–145)
VENTRICULAR RATE, ECG03: 94 BPM
VENTRICULAR RATE, ECG03: 99 BPM
WBC # BLD AUTO: 13.4 K/UL (ref 4.6–13.2)

## 2020-07-30 PROCEDURE — 77030021352 HC CBL LD SYS DISP COVD -B

## 2020-07-30 PROCEDURE — 82140 ASSAY OF AMMONIA: CPT

## 2020-07-30 PROCEDURE — 96376 TX/PRO/DX INJ SAME DRUG ADON: CPT

## 2020-07-30 PROCEDURE — 70553 MRI BRAIN STEM W/O & W/DYE: CPT

## 2020-07-30 PROCEDURE — A9575 INJ GADOTERATE MEGLUMI 0.1ML: HCPCS | Performed by: INTERNAL MEDICINE

## 2020-07-30 PROCEDURE — 80048 BASIC METABOLIC PNL TOTAL CA: CPT

## 2020-07-30 PROCEDURE — 99218 HC RM OBSERVATION: CPT

## 2020-07-30 PROCEDURE — 84100 ASSAY OF PHOSPHORUS: CPT

## 2020-07-30 PROCEDURE — 93005 ELECTROCARDIOGRAM TRACING: CPT

## 2020-07-30 PROCEDURE — 74011250637 HC RX REV CODE- 250/637: Performed by: INTERNAL MEDICINE

## 2020-07-30 PROCEDURE — 80076 HEPATIC FUNCTION PANEL: CPT

## 2020-07-30 PROCEDURE — 83735 ASSAY OF MAGNESIUM: CPT

## 2020-07-30 PROCEDURE — 85610 PROTHROMBIN TIME: CPT

## 2020-07-30 PROCEDURE — 74011250636 HC RX REV CODE- 250/636: Performed by: INTERNAL MEDICINE

## 2020-07-30 PROCEDURE — 36415 COLL VENOUS BLD VENIPUNCTURE: CPT

## 2020-07-30 PROCEDURE — 85025 COMPLETE CBC W/AUTO DIFF WBC: CPT

## 2020-07-30 PROCEDURE — 83036 HEMOGLOBIN GLYCOSYLATED A1C: CPT

## 2020-07-30 RX ORDER — HEPARIN SODIUM 5000 [USP'U]/ML
5000 INJECTION, SOLUTION INTRAVENOUS; SUBCUTANEOUS EVERY 8 HOURS
Status: DISCONTINUED | OUTPATIENT
Start: 2020-07-30 | End: 2020-07-30 | Stop reason: HOSPADM

## 2020-07-30 RX ORDER — GADOTERATE MEGLUMINE 376.9 MG/ML
15 INJECTION INTRAVENOUS
Status: COMPLETED | OUTPATIENT
Start: 2020-07-30 | End: 2020-07-30

## 2020-07-30 RX ADMIN — SENNOSIDES 8.6 MG: 8.6 TABLET, FILM COATED ORAL at 08:32

## 2020-07-30 RX ADMIN — LEVETIRACETAM 1500 MG: 500 TABLET ORAL at 08:31

## 2020-07-30 RX ADMIN — Medication 10 ML: at 06:32

## 2020-07-30 RX ADMIN — LEVOTHYROXINE SODIUM 100 MCG: 0.03 TABLET ORAL at 06:32

## 2020-07-30 RX ADMIN — ONDANSETRON 4 MG: 2 INJECTION INTRAMUSCULAR; INTRAVENOUS at 00:18

## 2020-07-30 RX ADMIN — GADOTERATE MEGLUMINE 15 ML: 376.9 INJECTION INTRAVENOUS at 13:00

## 2020-07-30 NOTE — PROGRESS NOTES
Problem: Seizure Disorder (Adult)  Goal: *STG: Remains free of seizure activity  Outcome: Progressing Towards Goal  Goal: *STG: Maintains lab values within therapeutic range  Outcome: Progressing Towards Goal  Goal: *STG/LTG: Complies with medication therapy  Outcome: Progressing Towards Goal  Goal: *STG: Remains free of injury during seizure activity  Outcome: Progressing Towards Goal  Goal: *STG: Remains safe in hospital  Outcome: Progressing Towards Goal  Goal: Interventions  Outcome: Progressing Towards Goal     Problem: Pain  Goal: *Control of Pain  Outcome: Progressing Towards Goal     Problem: Falls - Risk of  Goal: *Absence of Falls  Description: Document Karime Fall Risk and appropriate interventions in the flowsheet.   Outcome: Progressing Towards Goal  Note: Fall Risk Interventions:  Mobility Interventions: Patient to call before getting OOB         Medication Interventions: Patient to call before getting OOB    Elimination Interventions: Call light in reach
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
## 2020-07-30 NOTE — DISCHARGE INSTRUCTIONS
HOSPITALIST DISCHARGE INSTRUCTIONS  NAME: Kasie Dubois   :  1990   MRN:  [id number]     Date/Time:  2020 10:16 AM    ADMIT DATE: 2020     DISCHARGE DATE: 2020     DISCHARGE DIAGNOSIS:  SEIZURE    MEDICATIONS:       · It is important that you take the medication exactly as they are prescribed. · Keep your medication in the bottles provided by the pharmacist and keep a list of the medication names, dosages, and times to be taken in your wallet. · Do not take other medications without consulting your doctor. Pain Management: per above medications    What to do at Home    Recommended diet:  Resume previous diet    Recommended activity: Activity as tolerated  Seizure precautions. No driving for 3 months from the last seizure. Cont. Home dose keppra. Avoid medicines that can lower her seizure threshold. If you experience any of the following symptoms then please call your primary care physician or return to the emergency room if you cannot get hold of your doctor:  Fever, chills, nausea, vomiting, diarrhea, change in mentation, falling, bleeding, shortness of breath. Any new or worsening symptoms. Or call 911 for emergency. Follow Up:  Dr. Nkechi Escudero MD  you are to call and set up an appointment to see them in 7 days. Information obtained by :  I understand that if any problems occur once I am at home I am to contact my physician. I understand and acknowledge receipt of the instructions indicated above.                                                                                                                                            Physician's or R.N.'s Signature                                                                  Date/Time                                                                                                                                              Patient or Representative Signature Date/Time

## 2020-07-30 NOTE — PROGRESS NOTES
Internal Medicine Progress Note        NAME: Chari Mack   :  1990  MRM:  [id number]    Date/Time: 2020        ASSESSMENT/PLAN:    Principal Problem:    Epilepsy ([facility] [address] (2019)      Overview: Diagnosed at 14, first tonic-clonic      Last tonic-clonic 19 7 min seizure, no auras    Active Problems:    GERD (gastroesophageal reflux disease) (2019)      Acquired hypothyroidism (2019)      Substance use disorder (2020)      Obesity (2020)      Seizure ([address] (2020)      Migraine (2020)         # Seizure episode in patient with history of  Epilepsy in setting of recent UTI. Tele neurology in ER. Iv ativan prn . Monitor electrolytes. MRI brain w/wo per neurology rec. Home regimen of Keppra. Avoid agents lower SZ thresholds. MRI head pending   Seizure precautions. No driving for 3 months from the last seizure. Cont. Home dose keppra. Avoid medicines that can lower her seizure threshold. # ho   GERD (gastroesophageal reflux disease) (2019). PPI      #  Acquired hypothyroidism (2019) on replacment      #  Substance use disorder (2020). Positive urine drug screen to [facility] ( report using it for her chronic pains, cyclic N/V)she us it 3/week. Not sure what PCP or how came to her system.      # ho  Obesity (2020). There is no height or weight on file to calculate BMI.     #   Migraine (2020). Frequent episodes. Follow with neurology clinic. Use Amytriptaline for it.       -  DVT prophylaxis :  SCD.   -  Code Status : FULL    None    Lab Review:     Recent Labs     20  0415 20  1138   WBC 13.4* 14.7*   HGB 11.3* 14.6   HCT 35.1 44.6    487*     Recent Labs     20  0415 20  1138    134*   K 4.0 4.6    104   CO2 25 29   GLU 84 94   BUN 7 6*   CREA 0.84 0.93   CA 8.3* 9.4   MG 2.4  --    PHOS 2.9  --    ALB 3.3* 4.3   TBILI 0.5 0.3   ALT 45 48   INR 1.1  --      No results found for: GLUCPOC  No results for input(s): PH, PCO2, PO2, HCO3, FIO2 in the last 72 hours. Recent Labs     07/30/20  0415   INR 1.1       No results found for: SDES  Lab Results   Component Value Date/Time    Culture result: MIXED UROGENITAL ALCIDES ISOLATED 07/22/2020 02:20 AM    Culture result: NO GROWTH 6 DAYS 07/22/2020 02:00 AM    Culture result: NO GROWTH 6 DAYS 07/22/2020 02:00 AM       All Cardiac Markers in the last 24 hours: No results found for: CPK, CK, CKMMB, CKMB, RCK3, CKMBT, CKNDX, CKND1, VICTORINA, TROPT, TROIQ, ARELY, TROPT, TNIPOC, BNP, BNPP        Intervals noted   Pt s/e @ bedside     Subjective:     Chief Complaint:      Feel better slept ok  No SZ, no headache    ROS:  (bold if positive,otherwise negative)    Fever/chills ,  Dysuria   Cough , Sputum , SOB/BORRERO , Chest Pain     Diarrhea ,Nausea/Vomit , Abd Pain , Constipation    Tolerating Diet                Objective:     Vitals:  Last 24hrs VS reviewed since prior progress note. Most recent are:    Visit Vitals  /62   Pulse (!) 102   Temp 98.9 °F (37.2 °C)   Resp 18   Ht 5' 2\" (1.575 m)   Wt 83.5 kg (184 lb 1.6 oz)   SpO2 98%   BMI 33.67 kg/m²     SpO2 Readings from Last 6 Encounters:   07/30/20 98%   07/25/20 100%   07/20/20 100%   09/16/19 99%            Intake/Output Summary (Last 24 hours) at 7/30/2020 1018  Last data filed at 7/30/2020 0959  Gross per 24 hour   Intake 1128.75 ml   Output 1000 ml   Net 128.75 ml          Physical Exam:   Gen:  Appear stated age, Well-developed, well-nourished, in no acute distress  HEENT:  Head atraumatic, normocephalic , hearing intact to voice, moist mucous membranes. Neck:   Trachea midline , No apparent JVD, Supple. Resp:  No accessory muscle use,Bilateral BS present, clear breath sounds without wheezes rales or rhonchi  Card: normal S1, S2 without Gallop . No Significant lower leg peripheral edema. Abd:  Soft, non-tender, non-distended, bowel sounds are present . Musc:  No cyanosis or clubbing.   Skin:  No rashes or ulcers, skin turgor is good. Neuro:  Cranial nerves are grossly intact, no clear area of focal motor weakness, follows commands appropriately. Psych:  oriented to person, place and time, alert. Medications Reviewed: (see below)    Lab Data Reviewed: (see below)    ______________________________________________________________________    Medications:     Current Facility-Administered Medications   Medication Dose Route Frequency    sodium chloride (NS) flush 5-40 mL  5-40 mL IntraVENous Q8H    LORazepam (ATIVAN) injection 2 mg  2 mg IntraVENous Q2H PRN    amitriptyline (ELAVIL) tablet 50 mg  50 mg Oral QHS    fluticasone propionate (FLONASE) 50 mcg/actuation nasal spray 2 Spray  2 Spray Both Nostrils DAILY PRN    levothyroxine (SYNTHROID) tablet 100 mcg  100 mcg Oral Once per day on Mon Tue Wed Thu Fri    [START ON 8/1/2020] levothyroxine (SYNTHROID) tablet 88 mcg  88 mcg Oral Once per day on Sun Sat    senna (SENOKOT) tablet 8.6 mg  1 Tab Oral DAILY    ondansetron (ZOFRAN) injection 4 mg  4 mg IntraVENous Q6H PRN    ketorolac (TORADOL) injection 30 mg  30 mg IntraVENous Q6H PRN    levETIRAcetam (KEPPRA) tablet 1,500 mg  1,500 mg Oral BID    polyethylene glycol (MIRALAX) packet 17 g  17 g Oral DAILY    rizatriptan (MAXALT-MLT) dissolvable tablet 10 mg (Patient Supplied)  10 mg Oral ONCE PRN          Total time spent with patient: 35 minutes                  Care Plan discussed with: Patient, Care Manager, Nursing Staff and >50% of time spent in counseling and coordination of care    Discussed:  Care Plan    Prophylaxis:  Hep SQ    Disposition:  Home w/Family           This document in whole or part of it has been produced using voice recognition software. Unrecognized errors in transcription may be present.     Attending Physician: Isis Rosas MD

## 2020-07-30 NOTE — PROGRESS NOTES
conducted an initial consultation and Spiritual Assessment for [facility] [place], who is a 30 y.o.,female. Patients Primary Language is: [place]. According to the patients EMR Shinto Affiliation is: No Catholic. The reason the Patient came to the hospital is:   Patient Active Problem List    Diagnosis Date Noted    Substance use disorder 07/29/2020    Obesity 07/29/2020    Seizure ([facility] [address] 07/29/2020    Migraine 07/29/2020    UTI (urinary tract infection) 07/22/2020    Nausea & vomiting 07/22/2020    Epilepsy ([facility] [address] 99/98/7503    Cyclic vomiting syndrome 09/16/2019    GERD (gastroesophageal reflux disease) 09/16/2019    Hiatal hernia 09/16/2019    Acquired hypothyroidism 09/16/2019        The  provided the following Interventions:  Initiated a relationship of care and support with patient in room 2217 this morning. Listened empathically as she talked about her being here and how she is feeling at the present time. Patient says that she is feeling better than yesterday. Provided information about Spiritual Care Services. Offered prayer and assurance of continued prayers on patients behalf. The following outcomes were achieved:  Patient shared limited information about her medical narrative . Patient processed feeling about current hospitalization. Patient expressed gratitude for pastoral care visit. Assessment:  Patient does not have any Pentecostalism/cultural needs that will affect patients preferences in health care. There are no further spiritual or Pentecostalism issues which require Spiritual Care Services interventions at this time. Plan:  Chaplains will continue to follow and will provide pastoral care on an as needed/requested basis    . Armaan Mayes   Spiritual Care   [phone]

## 2020-07-30 NOTE — PROGRESS NOTES
Problem: Discharge Planning  Goal: *Discharge to safe environment  Outcome: Resolved/Met   Plan home    Reason for Admission:   seizure                   RUR Score:      15%               Plan for utilizing home health:      no    PCP: First and Last name:  [facility]   Name of Practice:    Are you a current patient: Yes/No: yes   Approximate date of last visit: ?   Can you participate in a virtual visit with your PCP: ?                    Current Advanced Directive/Advance Care Plan: none                         Transition of Care Plan:    Unable to interview pt,not in room,  down at [facility]. Pt just dc'd  From this hosp 7/25. Reviewed chart, last admit here for abd pain, here for sz this admit     Pt lives with spouse who is currently deployed, per chart from last admit she is working on bringing him home. She designated her father, who is on current face sheet for this admit for dcp. Plan return home at [place]. Cm to follow for hh needs. Pt back from test. She states no changes in dme or demographics since last admit. Still designates father for dcp. Pt changed to observation  Patient and/or next of kin has been given the Outpatient Observation Information and Notification letter and all questions answered. Patient has designated ________________________ to participate in his/her discharge plan and to receive any needed information. Name: father Acevedo  Address:  Phone number: [phone] [place] [facility]  PCP Verified by CM: No  Palliative Care Criteria Met (RRAT>21 & CHF Dx)?: No  Mode of Transport at Discharge:  Other (see comment)  Discharge Durable Medical Equipment: No  Physical Therapy Consult: No  Occupational Therapy Consult: No  Speech Therapy Consult: No  Current Support Network: Lives with Spouse  Confirm Follow Up Transport: Family  The Patient and/or Patient Representative was Provided with a Choice of Provider and Agrees with the Discharge Plan?: No  Freedom of Choice List was Provided with Basic Dialogue that Supports the Patient's Individualized Plan of Care/Goals, Treatment Preferences and Shares the Quality Data Associated with the Providers?: No  Discharge Location  Discharge Placement: Home

## 2020-07-30 NOTE — PROGRESS NOTES
Problem: Seizure Disorder (Adult)  Goal: *STG: Remains free of seizure activity  Outcome: Progressing Towards Goal  Goal: *STG: Maintains lab values within therapeutic range  Outcome: Progressing Towards Goal  Goal: *STG/LTG: Complies with medication therapy  Outcome: Progressing Towards Goal  Goal: *STG: Remains free of injury during seizure activity  Outcome: Progressing Towards Goal  Goal: *STG: Remains safe in hospital  Outcome: Progressing Towards Goal  Goal: Interventions  Outcome: Progressing Towards Goal     Problem: Patient Education: Go to Patient Education Activity  Goal: Patient/Family Education  Outcome: Progressing Towards Goal     Problem: Pain  Goal: *Control of Pain  Outcome: Progressing Towards Goal     Problem: Patient Education: Go to Patient Education Activity  Goal: Patient/Family Education  Outcome: Progressing Towards Goal     Problem: Falls - Risk of  Goal: *Absence of Falls  Description: Document Karime Fall Risk and appropriate interventions in the flowsheet.   Outcome: Progressing Towards Goal  Note: Fall Risk Interventions:     Problem: Nausea/Vomiting (Adult)  Goal: *Absence of nausea/vomiting  Outcome: Progressing Towards Goal     Problem: Patient Education: Go to Patient Education Activity  Goal: Patient/Family Education  Outcome: Progressing Towards Goal

## 2020-07-30 NOTE — CONSULTS
Neurology Consultation    Name: Kasie Dubois  MRN: [id number]  Age: 31 y.o., : 1990    Referring Physician:  Christian Bernal  Referring Service:  [unfilled]  Consulting Physician: Senthil Jimenez MD  Date of Consult: 2020    History of Present Illness:    31 y.o [de-identified] F with hx of  Epilepsy who came in after having a breakthrough seizure at home. She reported that she was watching TV and then she lost her  consciousness for unknown amount of time. Her seizure was followed by migraine like headache, she has UTI and was started on Levaquin recently. She is on Keppra with maybe one missing dose few days ago. She was admitted for seizure work up. No breakthrough seizure since then.      PAST MEDICAL HISTORY:  Past Medical History:   Diagnosis Date    Colitis     Mild    Cyclic vomiting syndrome     Depression     (versus Chronic Pain?)    Epilepsy (HCC)     GERD (gastroesophageal reflux disease)     Hiatal hernia     Hypothyroidism     Migraines     Obesity (BMI 30-39. 9)     BMI 31.09 on 2020    UTI due to Klebsiella species       History reviewed. No pertinent surgical history. IMAGING:   MRI brain pending. Physical Examination:   GENERAL: No acute distress, appears stated age. Mentating well. Language intact. CN: PERRLA, EOMI, face symmetric tongue midline palate raises symmetrically no dysarthria  Motor - Normal bulk, strength with 5/5 strength throughout bilaterally. Sensation - symmetric   Coordination - normal finger to nose and heel to shin bilaterally. Gait - deferred     Impression:    31 y.o with epilepsy on keppra presented with breakthrough seizure in the sitting of UTI treated with Levaquin. Recommendations:  CTH neg for acute finding. Seizure precautions. No driving for 3 months from the last seizure. Cont. Home dose keppra. Avoid medicines that can lower her seizure threshold. Check UA again.    CURRENT MEDICATIONS:    Current Facility-Administered Medications:     sodium chloride (NS) flush 5-40 mL, 5-40 mL, IntraVENous, Q8H, Todd Wilson MD, 10 mL at 07/30/20 8847    LORazepam (ATIVAN) injection 2 mg, 2 mg, IntraVENous, Q2H PRN, Kamila Padgett MD    amitriptyline (ELAVIL) tablet 50 mg, 50 mg, Oral, QHS, Todd Wilson MD, 50 mg at 07/29/20 2252    fluticasone propionate (FLONASE) 50 mcg/actuation nasal spray 2 Spray, 2 Spray, Both Nostrils, DAILY PRN, Kamila Padgett MD    levothyroxine (SYNTHROID) tablet 100 mcg, 100 mcg, Oral, Once per day on Mon Tue Wed Thu Fri, Kmaila Padgett MD, 100 mcg at 07/30/20 9638    [START ON 8/1/2020] levothyroxine (SYNTHROID) tablet 88 mcg, 88 mcg, Oral, Once per day on Sun Sat, Kamila Padgett MD    senna (SENOKOT) tablet 8.6 mg, 1 Tab, Oral, DAILY, Todd Wilson MD, 8.6 mg at 07/30/20 3247    ondansetron (ZOFRAN) injection 4 mg, 4 mg, IntraVENous, Q6H PRN, Kamila Padgett MD, 4 mg at 07/30/20 0018    ketorolac (TORADOL) injection 30 mg, 30 mg, IntraVENous, Q6H PRN, Aurelio Babinski, MD, 30 mg at 07/29/20 2026    levETIRAcetam (KEPPRA) tablet 1,500 mg, 1,500 mg, Oral, BID, Kamila Padgett MD, 1,500 mg at 07/30/20 0831    polyethylene glycol (MIRALAX) packet 17 g, 17 g, Oral, DAILY, Todd Wilson MD    rizatriptan (MAXALT-MLT) dissolvable tablet 10 mg (Patient Supplied), 10 mg, Oral, ONCE PRN, Kamila Padgett MD     ALLERGIES:  Allergies   Allergen Reactions    Milk Containing Products Anaphylaxis    Sulfa (Sulfonamide Antibiotics) Anaphylaxis    Ciprofloxacin Nausea and Vomiting    Codeine Nausea and Vomiting    Doxycycline Hives    Gluten Swelling    Penicillins Hives        PAST FAMILY AND SOCIAL HISTORY:  Family History   Problem Relation Age of Onset    Osteoporosis Mother     Thyroid Disease Mother         Hypothyroidism    Osteoporosis Maternal Grandmother     Thyroid Disease Maternal Grandmother         Hypothyroidism Social History     Socioeconomic History    Marital status:      Spouse name: Not on file    Number of children: Not on file    Years of education: Not on file    Highest education level: Not on file   Occupational History    Not on file   Social Needs    Financial resource strain: Not on file    Food insecurity     Worry: Not on file     Inability: Not on file    Transportation needs     Medical: Not on file     Non-medical: Not on file   Tobacco Use    Smoking status: Never Smoker    Smokeless tobacco: Never Used   Substance and Sexual Activity    Alcohol use: Not Currently    Drug use: Not on file    Sexual activity: Yes   Lifestyle    Physical activity     Days per week: Not on file     Minutes per session: Not on file    Stress: Not on file   Relationships    Social connections     Talks on phone: Not on file     Gets together: Not on file     Attends Evangelical service: Not on file     Active member of club or organization: Not on file     Attends meetings of clubs or organizations: Not on file     Relationship status: Not on file    Intimate partner violence     Fear of current or ex partner: Not on file     Emotionally abused: Not on file     Physically abused: Not on file     Forced sexual activity: Not on file   Other Topics Concern     Service Not Asked    Blood Transfusions Not Asked    Caffeine Concern Not Asked    Occupational Exposure Not Asked   [employer] Not Asked    Sleep Concern Not Asked    Stress Concern Not Asked    Weight Concern Not Asked    Special Diet Not Asked    Back Care Not Asked    Exercise Not Asked    Bike Helmet Not Asked   [address] Not Asked    Self-Exams Not Asked   Social History Narrative    Not on file        REVIEW OF SYSTEMS:   A 10 point review of systems was completed and is that which was stated in the HPI and that which follows. LABS:   Results: All lab results for the last 24 hours reviewed.       Thank you for the opportunity to participate in the care of your patient.      Electronically signed by Gilberto Oreilly MD  7/30/2020  9:49 AM

## 2020-07-30 NOTE — PROGRESS NOTES
Pharmacy: Non-Formulary Medication [facility]    Please note that medication Levetiracetam ER 3,000mg QHS is not on the formulary and has been changed to Levetiracetam 1,500mg BID. If patient prefers to take home medication, please have family member bring from home for pharmacy to verify. Please call pharmacy for questions.     Thanks,  Gurjit Brand, PharmD

## 2020-07-30 NOTE — ROUTINE PROCESS
1576 Received report from [facility] RN. Patient alert and oriented. 1300 Patient off the floor to MRI. [phone] Patient for discharge today. Instructed to continue on home medications and follow-up with PCP. Patient verbalized understanding.

## 2020-07-30 NOTE — PROGRESS NOTES
TRANSFER - IN REPORT:    [facility] for routine progression of care      Report consisted of patients Situation, Background, Assessment and   Recommendations(SBAR). Information from the following report(s) SBAR, Kardex, Intake/Output, MAR and Recent Results was reviewed with the receiving nurse. Opportunity for questions and clarification was provided. 2040-Pt brought from ED via [facility]. Pt alert and oriented x 4  C/o neck pain 7/10   Assessment completed plan of care for the shift explained pt verbalized understanding. Seizure precaution in place. 2050- Pt vomitted x 1 small amount yellow in color. 2345- Pt given dinner frozen turkey eat a ittle bit and was nauseous pt state she is allergic to gluten. Food discarded pt given emesis bag     0000- Notified Dr Marlee Bishop about pt's HR - Pt tachycardic and the  MEWS 3 order received for cardiac monitor. 0018- Pt given Zofran 4 mg iv     0100- Pt sleeping,  0200- Pt sleeping. IVF infusing well. 0630- pt sleeping  No concerns. 0740- Bedside and Verbal shift change report given to [address] [place] [address] (oncoming nurse) by Junior Talita RN (offgoing nurse). Report included the following information SBAR, Kardex, Intake/Output, MAR and Recent Results.

## 2020-07-31 LAB — LEVETIRACETAM SERPL-MCNC: <1 UG/ML (ref 10–40)

## 2020-07-31 RX ORDER — LORAZEPAM 2 MG/ML
2 INJECTION INTRAMUSCULAR
Status: ACTIVE | OUTPATIENT
Start: 2020-07-29 | End: 2020-07-30

## 2020-07-31 RX ORDER — LORAZEPAM 2 MG/ML
2 INJECTION INTRAMUSCULAR
Status: COMPLETED | OUTPATIENT
Start: 2020-07-29 | End: 2020-07-29

## 2020-07-31 RX ORDER — LORAZEPAM 2 MG/ML
2 INJECTION INTRAMUSCULAR
Status: DISCONTINUED | OUTPATIENT
Start: 2020-07-31 | End: 2020-07-31

## 2020-08-18 ENCOUNTER — TELEPHONE (OUTPATIENT)
Dept: CASE MANAGEMENT | Age: 30
End: 2020-08-18

## 2020-08-18 NOTE — TELEPHONE ENCOUNTER
Patient resolved from Transition of Care episode on 8/18/2020  Discussed COVID-19 related testing which was not done at this time. Test results were not done. Patient informed of results, if available? no     Patient/family has been provided the following resources and education related to COVID-19:                         Signs, symptoms and red flags related to COVID-19            CDC exposure and quarantine guidelines            [facility] exposure contact - [phone]            Contact for their local Department of Health                 Patient currently reports that the following symptoms have improved:  vomiting. No further outreach scheduled with this CTN/ACM/LPN/HC/ MA. Episode of Care resolved. Patient has this CTN/ACM/LPN/HC/MA contact information if future needs arise.